# Patient Record
Sex: MALE | Race: WHITE | Employment: OTHER | URBAN - METROPOLITAN AREA
[De-identification: names, ages, dates, MRNs, and addresses within clinical notes are randomized per-mention and may not be internally consistent; named-entity substitution may affect disease eponyms.]

---

## 2018-02-15 ENCOUNTER — HOSPITAL ENCOUNTER (OUTPATIENT)
Dept: PREADMISSION TESTING | Age: 78
Discharge: HOME | End: 2018-02-15
Payer: MEDICARE

## 2018-02-15 VITALS
BODY MASS INDEX: 23.39 KG/M2 | HEIGHT: 67 IN | SYSTOLIC BLOOD PRESSURE: 136 MMHG | DIASTOLIC BLOOD PRESSURE: 76 MMHG | WEIGHT: 149 LBS | TEMPERATURE: 97.9 F | HEART RATE: 80 BPM

## 2018-02-15 LAB
ABO + RH BLD: NORMAL
ANION GAP SERPL CALC-SCNC: 9 MMOL/L (ref 5–15)
APPEARANCE UR: CLEAR
ATRIAL RATE: 70 BPM
BACTERIA URNS QL MICRO: NEGATIVE /HPF
BILIRUB UR QL: NEGATIVE
BLOOD GROUP ANTIBODIES SERPL: NORMAL
BUN SERPL-MCNC: 18 MG/DL (ref 6–20)
BUN/CREAT SERPL: 17 (ref 12–20)
CALCIUM SERPL-MCNC: 9.1 MG/DL (ref 8.5–10.1)
CALCULATED P AXIS, ECG09: 80 DEGREES
CALCULATED R AXIS, ECG10: 65 DEGREES
CALCULATED T AXIS, ECG11: 68 DEGREES
CHLORIDE SERPL-SCNC: 103 MMOL/L (ref 97–108)
CO2 SERPL-SCNC: 26 MMOL/L (ref 21–32)
COLOR UR: ABNORMAL
CREAT SERPL-MCNC: 1.07 MG/DL (ref 0.7–1.3)
DIAGNOSIS, 93000: NORMAL
EPITH CASTS URNS QL MICRO: ABNORMAL /LPF
ERYTHROCYTE [DISTWIDTH] IN BLOOD BY AUTOMATED COUNT: 14.3 % (ref 11.5–14.5)
EST. AVERAGE GLUCOSE BLD GHB EST-MCNC: 105 MG/DL
GLUCOSE SERPL-MCNC: 83 MG/DL (ref 65–100)
GLUCOSE UR STRIP.AUTO-MCNC: NEGATIVE MG/DL
HBA1C MFR BLD: 5.3 % (ref 4.2–6.3)
HCT VFR BLD AUTO: 38.7 % (ref 36.6–50.3)
HGB BLD-MCNC: 12.6 G/DL (ref 12.1–17)
HGB UR QL STRIP: NEGATIVE
HYALINE CASTS URNS QL MICRO: ABNORMAL /LPF (ref 0–5)
INR PPP: 1 (ref 0.9–1.1)
KETONES UR QL STRIP.AUTO: NEGATIVE MG/DL
LEUKOCYTE ESTERASE UR QL STRIP.AUTO: ABNORMAL
MCH RBC QN AUTO: 31.1 PG (ref 26–34)
MCHC RBC AUTO-ENTMCNC: 32.6 G/DL (ref 30–36.5)
MCV RBC AUTO: 95.6 FL (ref 80–99)
NITRITE UR QL STRIP.AUTO: NEGATIVE
NRBC # BLD: 0 K/UL (ref 0–0.01)
NRBC BLD-RTO: 0 PER 100 WBC
P-R INTERVAL, ECG05: 150 MS
PH UR STRIP: 5 [PH] (ref 5–8)
PLATELET # BLD AUTO: 269 K/UL (ref 150–400)
PMV BLD AUTO: 10.5 FL (ref 8.9–12.9)
POTASSIUM SERPL-SCNC: 3.9 MMOL/L (ref 3.5–5.1)
PROT UR STRIP-MCNC: NEGATIVE MG/DL
PROTHROMBIN TIME: 10.4 SEC (ref 9–11.1)
Q-T INTERVAL, ECG07: 424 MS
QRS DURATION, ECG06: 82 MS
QTC CALCULATION (BEZET), ECG08: 457 MS
RBC # BLD AUTO: 4.05 M/UL (ref 4.1–5.7)
RBC #/AREA URNS HPF: ABNORMAL /HPF (ref 0–5)
SODIUM SERPL-SCNC: 138 MMOL/L (ref 136–145)
SP GR UR REFRACTOMETRY: 1.02 (ref 1–1.03)
SPECIMEN EXP DATE BLD: NORMAL
UA: UC IF INDICATED,UAUC: ABNORMAL
UROBILINOGEN UR QL STRIP.AUTO: 0.2 EU/DL (ref 0.2–1)
VENTRICULAR RATE, ECG03: 70 BPM
WBC # BLD AUTO: 6.8 K/UL (ref 4.1–11.1)
WBC URNS QL MICRO: ABNORMAL /HPF (ref 0–4)

## 2018-02-15 PROCEDURE — 86900 BLOOD TYPING SEROLOGIC ABO: CPT | Performed by: ORTHOPAEDIC SURGERY

## 2018-02-15 PROCEDURE — 36415 COLL VENOUS BLD VENIPUNCTURE: CPT | Performed by: ORTHOPAEDIC SURGERY

## 2018-02-15 PROCEDURE — 80048 BASIC METABOLIC PNL TOTAL CA: CPT | Performed by: ORTHOPAEDIC SURGERY

## 2018-02-15 PROCEDURE — 87086 URINE CULTURE/COLONY COUNT: CPT | Performed by: ORTHOPAEDIC SURGERY

## 2018-02-15 PROCEDURE — 85027 COMPLETE CBC AUTOMATED: CPT | Performed by: ORTHOPAEDIC SURGERY

## 2018-02-15 PROCEDURE — 85610 PROTHROMBIN TIME: CPT | Performed by: ORTHOPAEDIC SURGERY

## 2018-02-15 PROCEDURE — 83036 HEMOGLOBIN GLYCOSYLATED A1C: CPT | Performed by: ORTHOPAEDIC SURGERY

## 2018-02-15 PROCEDURE — 81001 URINALYSIS AUTO W/SCOPE: CPT | Performed by: ORTHOPAEDIC SURGERY

## 2018-02-15 PROCEDURE — 93005 ELECTROCARDIOGRAM TRACING: CPT

## 2018-02-15 RX ORDER — TAMSULOSIN HYDROCHLORIDE 0.4 MG/1
0.8 CAPSULE ORAL
COMMUNITY

## 2018-02-15 RX ORDER — NYSTATIN 100000 [USP'U]/G
POWDER TOPICAL AS NEEDED
COMMUNITY
End: 2018-02-24

## 2018-02-15 RX ORDER — NAPROXEN SODIUM 220 MG
220 TABLET ORAL
COMMUNITY
End: 2018-02-24

## 2018-02-15 RX ORDER — HYDROCHLOROTHIAZIDE 12.5 MG/1
12.5 TABLET ORAL
COMMUNITY

## 2018-02-15 RX ORDER — DOXYCYCLINE 50 MG/1
50 TABLET ORAL
COMMUNITY

## 2018-02-15 NOTE — PERIOP NOTES
PREOPERATIVE INSTRUCTIONS REVIEWED WITH PATIENT. PATIENT GIVEN   CHG WIPES. INSTRUCTIONS REVIEWED  IN CLASS ON USE OF CHG WIPES. PATIENT GIVEN SSI INFECTION SHEET AND ALSO  MRSA/MSSA TREATMENT INSTRUCTION SHEET  WITH AN EXPLANATION TO PATIENT THAT THEY WILL BE NOTIFIED IF TREATMENT INSTRUCTIONS NEED TO BE INITIATED. PATIENT WAS GIVEN THE OPPORTUNITY TO ASK QUESTIONS ON THE INFORMATION PROVIDED.

## 2018-02-16 PROBLEM — M16.11 PRIMARY OSTEOARTHRITIS OF RIGHT HIP: Status: ACTIVE | Noted: 2018-02-16

## 2018-02-16 LAB
BACTERIA SPEC CULT: NORMAL
BACTERIA SPEC CULT: NORMAL
SERVICE CMNT-IMP: NORMAL

## 2018-02-16 NOTE — H&P
Chief Complaint: Pain of the Right Hip     Saad Olivarez comes in for follow-up of the right hip. He continues to have right hip pain. He also has quite significant stiffness when he walks after long periods of sitting. He has significant anterior right hip pain with ambulation and he walks with a limp favoring the right leg. He feels that his hip pain is affecting his daily activities and is unable to walk even short distances without severe pain.      Review of Systems   1/29/2018  Genitourinary: Frequent Urination: Positive  Immunological: Seasonal Allergies: Positive  Musculoskeletal: Joint Pain: Positive     Medical History        Past Medical History:   Diagnosis Date    Hyperlipidemia              Surgical History         Past Surgical History:   Procedure Laterality Date    NO RELEVANT SURGERIES                Objective:      There were no vitals filed for this visit.     Constitutional:  No acute distress. Well nourished. Well developed. Eyes:  Sclera are nonicteric. Respiratory:  No labored breathing. Cardiovascular:  No marked edema. Skin:  No marked skin ulcers. Neurological:  No marked sensory loss noted. Psychiatric: Alert and oriented x3. Musculoskeletal      On exam he has no pain to rotation of the left hip. He has essentially no rotation of the right hip and any attempts to rotate the hip are painful. His right leg is about 1cm shorter than contralateral leg. He has an 8 degree flexion contracture on the right. No effusion. No sign of infection. No ecchymosis or erythema. No cellulitis or rash. No calf pain, no evidence of DVT. The neurovascular status is intact. Imaging/Studies:    X-ray Hip Right 2+ Views     Result Date: 1/22/2018  AP Pelvis, Frog Lateral. Notes    I did order interpreted x-rays today of the patient's right hip to include two views which reveal severe degenerative arthritis of the right hip with complete joint space narrowing and early acetabular protrusio presentation.       Assessment:      1. Primary osteoarthritis of right hip             Plan:   I showed the patient X-rays of the right hip and explained that he has severe degenerative arthritis of the right hip with bone-on-bone contact. I explained the pathophysiology and we discussed treatment options. I explained that the only option that would give him long-term relief at this point is total hip replacement.     I explained the hospitalization, post-op and rehabilitation for the procedure. I discussed the use of spinal anesthesia. The patient is aware of all complications associated with the surgery, including the chance of infection and blood clots. He understands that he would be on antibiotics and anti-coagulants following surgery to prevent infection and DVT and that he would undergo a course of post-op physical therapy. I discussed issues of leg length. I also discussed total hip precautions to avoid dislocation following the surgery. He understands the post-op rehabilitation period and all potential complications. Following discussion he would like to proceed with the surgery. All of his questions were answered on today's visit. Patient has advanced degenerative arthritis of right hip. He has protrusio of the acetabulum. His right leg is also shorter. He wants to go ahead with right total hip replacement. We discussed the procedure, hospitalization, and all potential complications. We discussed medical complications as well as surgical.  We discussed issues of infection, DVT, pulmonary embolus, and use of anticoagulants. We discussed issues of instability and leg length. PROCEDURE: Right total hip replacement. Date of Surgery Update:  Cindy Alcazar was seen and examined. Past Medical History:   Diagnosis Date    Arthritis     Cancer ([facility] [address]     COLO-RECTAL    Chronic pain     Hypertension      Prior to Admission Medications   Prescriptions Last Dose Informant Patient Reported? Taking? CHOLECALCIFEROL, VITAMIN D3, (VITAMIN D3 PO) 2/14/2018 at Unknown time  Yes Yes   Sig: Take 1.25 mg by mouth every seven (7) days. WEDNESDAY   CYANOCOBALAMIN, VITAMIN B-12, (VITAMIN B-12 INJECTION) 2/16/2018  Yes No   Sig: by Injection route every month. MULTIVITAMIN PO 2/14/2018 at Unknown time  Yes Yes   Sig: Take  by mouth daily. doxycycline (ADOXA) 50 mg tablet 2/20/2018 at 1700  Yes Yes   Sig: Take 50 mg by mouth daily. hydroCHLOROthiazide (HYDRODIURIL) 12.5 mg tablet 2/20/2018 at 0800  Yes Yes   Sig: Take 12.5 mg by mouth daily. naproxen sodium (ALEVE) 220 mg tablet 2/16/2018  Yes No   Sig: Take 220 mg by mouth daily as needed. nystatin (MYCOSTATIN) powder 2/21/2018 at 0430  Yes Yes   Sig: Apply  to affected area as needed. tamsulosin (FLOMAX) 0.4 mg capsule 2/20/2018 at 1700  Yes Yes   Sig: Take 0.4 mg by mouth daily. Facility-Administered Medications: None      Allergy to:Review of patient's allergies indicates no known allergies. Physical Examination: General appearance - alert, well appearing, and in no distress  History and physical has been reviewed. The patient has been examined.  There have been no significant clinical changes since the completion of the originally dated History and Physical.    Signed By: Segundo Mendoza PA-C     February 21, 2018 7:32 AM

## 2018-02-17 LAB
BACTERIA SPEC CULT: NORMAL
CC UR VC: NORMAL
SERVICE CMNT-IMP: NORMAL

## 2018-02-20 ENCOUNTER — ANESTHESIA EVENT (OUTPATIENT)
Dept: SURGERY | Age: 78
DRG: 470 | End: 2018-02-20
Payer: MEDICARE

## 2018-02-21 ENCOUNTER — ANESTHESIA (OUTPATIENT)
Dept: SURGERY | Age: 78
DRG: 470 | End: 2018-02-21
Payer: MEDICARE

## 2018-02-21 ENCOUNTER — HOSPITAL ENCOUNTER (INPATIENT)
Age: 78
LOS: 2 days | Discharge: HOME HEALTH | DRG: 470 | End: 2018-02-23
Attending: ORTHOPAEDIC SURGERY | Admitting: ORTHOPAEDIC SURGERY
Payer: MEDICARE

## 2018-02-21 ENCOUNTER — APPOINTMENT (OUTPATIENT)
Dept: GENERAL RADIOLOGY | Age: 78
DRG: 470 | End: 2018-02-21
Attending: ORTHOPAEDIC SURGERY
Payer: MEDICARE

## 2018-02-21 DIAGNOSIS — M16.11 PRIMARY OSTEOARTHRITIS OF RIGHT HIP: Primary | ICD-10-CM

## 2018-02-21 LAB
GLUCOSE BLD STRIP.AUTO-MCNC: 95 MG/DL (ref 65–100)
SERVICE CMNT-IMP: NORMAL

## 2018-02-21 PROCEDURE — 77030008467 HC STPLR SKN COVD -B: Performed by: ORTHOPAEDIC SURGERY

## 2018-02-21 PROCEDURE — 97161 PT EVAL LOW COMPLEX 20 MIN: CPT

## 2018-02-21 PROCEDURE — C1776 JOINT DEVICE (IMPLANTABLE): HCPCS | Performed by: ORTHOPAEDIC SURGERY

## 2018-02-21 PROCEDURE — 74011250636 HC RX REV CODE- 250/636: Performed by: PHYSICIAN ASSISTANT

## 2018-02-21 PROCEDURE — 74011250637 HC RX REV CODE- 250/637: Performed by: PHYSICIAN ASSISTANT

## 2018-02-21 PROCEDURE — 77030018846 HC SOL IRR STRL H20 ICUM -A: Performed by: ORTHOPAEDIC SURGERY

## 2018-02-21 PROCEDURE — 73501 X-RAY EXAM HIP UNI 1 VIEW: CPT

## 2018-02-21 PROCEDURE — 74011250637 HC RX REV CODE- 250/637: Performed by: NURSE PRACTITIONER

## 2018-02-21 PROCEDURE — 77030031139 HC SUT VCRL2 J&J -A: Performed by: ORTHOPAEDIC SURGERY

## 2018-02-21 PROCEDURE — 74011250636 HC RX REV CODE- 250/636

## 2018-02-21 PROCEDURE — 77030018836 HC SOL IRR NACL ICUM -A: Performed by: ORTHOPAEDIC SURGERY

## 2018-02-21 PROCEDURE — 77030011640 HC PAD GRND REM COVD -A: Performed by: ORTHOPAEDIC SURGERY

## 2018-02-21 PROCEDURE — 76060000035 HC ANESTHESIA 2 TO 2.5 HR: Performed by: ORTHOPAEDIC SURGERY

## 2018-02-21 PROCEDURE — 74011000250 HC RX REV CODE- 250

## 2018-02-21 PROCEDURE — 77030006822 HC BLD SAW SAG BRSM -B: Performed by: ORTHOPAEDIC SURGERY

## 2018-02-21 PROCEDURE — 77030020782 HC GWN BAIR PAWS FLX 3M -B

## 2018-02-21 PROCEDURE — 77030012406 HC DRN WND PENRS BARD -A: Performed by: ORTHOPAEDIC SURGERY

## 2018-02-21 PROCEDURE — 77030034850: Performed by: ORTHOPAEDIC SURGERY

## 2018-02-21 PROCEDURE — 77030012893

## 2018-02-21 PROCEDURE — 65270000029 HC RM PRIVATE

## 2018-02-21 PROCEDURE — 74011000250 HC RX REV CODE- 250: Performed by: PHYSICIAN ASSISTANT

## 2018-02-21 PROCEDURE — 74011000258 HC RX REV CODE- 258: Performed by: PHYSICIAN ASSISTANT

## 2018-02-21 PROCEDURE — 76210000000 HC OR PH I REC 2 TO 2.5 HR: Performed by: ORTHOPAEDIC SURGERY

## 2018-02-21 PROCEDURE — 74011250636 HC RX REV CODE- 250/636: Performed by: ANESTHESIOLOGY

## 2018-02-21 PROCEDURE — P9045 ALBUMIN (HUMAN), 5%, 250 ML: HCPCS

## 2018-02-21 PROCEDURE — 82962 GLUCOSE BLOOD TEST: CPT

## 2018-02-21 PROCEDURE — 77030012935 HC DRSG AQUACEL BMS -B: Performed by: ORTHOPAEDIC SURGERY

## 2018-02-21 PROCEDURE — 77030007866 HC KT SPN ANES BBMI -B: Performed by: ANESTHESIOLOGY

## 2018-02-21 PROCEDURE — 77030020788: Performed by: ORTHOPAEDIC SURGERY

## 2018-02-21 PROCEDURE — 77030011264 HC ELECTRD BLD EXT COVD -A: Performed by: ORTHOPAEDIC SURGERY

## 2018-02-21 PROCEDURE — 77030020061 HC IV BLD WRMR ADMIN SET 3M -B: Performed by: ANESTHESIOLOGY

## 2018-02-21 PROCEDURE — 77030013079 HC BLNKT BAIR HGGR 3M -A: Performed by: ANESTHESIOLOGY

## 2018-02-21 PROCEDURE — 0SR904A REPLACEMENT OF RIGHT HIP JOINT WITH CERAMIC ON POLYETHYLENE SYNTHETIC SUBSTITUTE, UNCEMENTED, OPEN APPROACH: ICD-10-PCS | Performed by: ORTHOPAEDIC SURGERY

## 2018-02-21 PROCEDURE — 76010000171 HC OR TIME 2 TO 2.5 HR INTENSV-TIER 1: Performed by: ORTHOPAEDIC SURGERY

## 2018-02-21 PROCEDURE — 77030032490 HC SLV COMPR SCD KNE COVD -B: Performed by: ORTHOPAEDIC SURGERY

## 2018-02-21 PROCEDURE — 77030035236 HC SUT PDS STRATFX BARB J&J -B: Performed by: ORTHOPAEDIC SURGERY

## 2018-02-21 DEVICE — STEM FEM SZ 6 L150MM 130DEG STD OFFSET CALCAR HIP BILAT TI: Type: IMPLANTABLE DEVICE | Site: HIP | Status: FUNCTIONAL

## 2018-02-21 DEVICE — LINER ACET OD54MM ID36MM HIP ALTRX PINN: Type: IMPLANTABLE DEVICE | Site: HIP | Status: FUNCTIONAL

## 2018-02-21 DEVICE — HEAD FEM DIA36MM +5MM OFFSET 12/14 TAPR HIP CERAMIC BIOLOX: Type: IMPLANTABLE DEVICE | Site: HIP | Status: FUNCTIONAL

## 2018-02-21 DEVICE — CUP ACET DIA54MM HIP GRIPTION PRI CEMENTLESS FIX SECT SER: Type: IMPLANTABLE DEVICE | Site: HIP | Status: FUNCTIONAL

## 2018-02-21 DEVICE — SCR ACET CANC PINN 6.5X15MM SS --: Type: IMPLANTABLE DEVICE | Site: HIP | Status: FUNCTIONAL

## 2018-02-21 DEVICE — COMPONENT TOT HIP PRIMARY CERM ALTRX: Type: IMPLANTABLE DEVICE | Status: FUNCTIONAL

## 2018-02-21 RX ORDER — TAMSULOSIN HYDROCHLORIDE 0.4 MG/1
0.8 CAPSULE ORAL EVERY EVENING
Status: DISCONTINUED | OUTPATIENT
Start: 2018-02-22 | End: 2018-02-23 | Stop reason: HOSPADM

## 2018-02-21 RX ORDER — MORPHINE SULFATE 10 MG/ML
2 INJECTION, SOLUTION INTRAMUSCULAR; INTRAVENOUS
Status: DISCONTINUED | OUTPATIENT
Start: 2018-02-21 | End: 2018-02-21 | Stop reason: HOSPADM

## 2018-02-21 RX ORDER — FENTANYL CITRATE 50 UG/ML
50 INJECTION, SOLUTION INTRAMUSCULAR; INTRAVENOUS AS NEEDED
Status: DISCONTINUED | OUTPATIENT
Start: 2018-02-21 | End: 2018-02-21 | Stop reason: HOSPADM

## 2018-02-21 RX ORDER — SODIUM CHLORIDE 9 MG/ML
25 INJECTION, SOLUTION INTRAVENOUS CONTINUOUS
Status: DISCONTINUED | OUTPATIENT
Start: 2018-02-21 | End: 2018-02-21 | Stop reason: HOSPADM

## 2018-02-21 RX ORDER — OXYCODONE AND ACETAMINOPHEN 5; 325 MG/1; MG/1
1 TABLET ORAL AS NEEDED
Status: DISCONTINUED | OUTPATIENT
Start: 2018-02-21 | End: 2018-02-21 | Stop reason: HOSPADM

## 2018-02-21 RX ORDER — CEFAZOLIN SODIUM/WATER 2 G/20 ML
2 SYRINGE (ML) INTRAVENOUS EVERY 8 HOURS
Status: COMPLETED | OUTPATIENT
Start: 2018-02-21 | End: 2018-02-22

## 2018-02-21 RX ORDER — FAMOTIDINE 20 MG/1
20 TABLET, FILM COATED ORAL DAILY
Status: DISCONTINUED | OUTPATIENT
Start: 2018-02-21 | End: 2018-02-23 | Stop reason: HOSPADM

## 2018-02-21 RX ORDER — CEFAZOLIN SODIUM/WATER 2 G/20 ML
2 SYRINGE (ML) INTRAVENOUS EVERY 8 HOURS
Status: DISCONTINUED | OUTPATIENT
Start: 2018-02-21 | End: 2018-02-21 | Stop reason: HOSPADM

## 2018-02-21 RX ORDER — GLYCOPYRROLATE 0.2 MG/ML
INJECTION INTRAMUSCULAR; INTRAVENOUS AS NEEDED
Status: DISCONTINUED | OUTPATIENT
Start: 2018-02-21 | End: 2018-02-21 | Stop reason: HOSPADM

## 2018-02-21 RX ORDER — SODIUM CHLORIDE 0.9 % (FLUSH) 0.9 %
5-10 SYRINGE (ML) INJECTION AS NEEDED
Status: DISCONTINUED | OUTPATIENT
Start: 2018-02-21 | End: 2018-02-21 | Stop reason: HOSPADM

## 2018-02-21 RX ORDER — HYDROMORPHONE HYDROCHLORIDE 1 MG/ML
0.5 INJECTION, SOLUTION INTRAMUSCULAR; INTRAVENOUS; SUBCUTANEOUS
Status: DISCONTINUED | OUTPATIENT
Start: 2018-02-21 | End: 2018-02-21

## 2018-02-21 RX ORDER — ONDANSETRON 2 MG/ML
4 INJECTION INTRAMUSCULAR; INTRAVENOUS AS NEEDED
Status: DISCONTINUED | OUTPATIENT
Start: 2018-02-21 | End: 2018-02-21 | Stop reason: HOSPADM

## 2018-02-21 RX ORDER — ONDANSETRON 2 MG/ML
INJECTION INTRAMUSCULAR; INTRAVENOUS AS NEEDED
Status: DISCONTINUED | OUTPATIENT
Start: 2018-02-21 | End: 2018-02-21 | Stop reason: HOSPADM

## 2018-02-21 RX ORDER — SODIUM CHLORIDE 9 MG/ML
125 INJECTION, SOLUTION INTRAVENOUS CONTINUOUS
Status: DISPENSED | OUTPATIENT
Start: 2018-02-21 | End: 2018-02-22

## 2018-02-21 RX ORDER — ONDANSETRON 2 MG/ML
4 INJECTION INTRAMUSCULAR; INTRAVENOUS
Status: ACTIVE | OUTPATIENT
Start: 2018-02-21 | End: 2018-02-22

## 2018-02-21 RX ORDER — MIDAZOLAM HYDROCHLORIDE 1 MG/ML
INJECTION, SOLUTION INTRAMUSCULAR; INTRAVENOUS AS NEEDED
Status: DISCONTINUED | OUTPATIENT
Start: 2018-02-21 | End: 2018-02-21 | Stop reason: HOSPADM

## 2018-02-21 RX ORDER — FACIAL-BODY WIPES
10 EACH TOPICAL DAILY PRN
Status: DISCONTINUED | OUTPATIENT
Start: 2018-02-23 | End: 2018-02-23 | Stop reason: HOSPADM

## 2018-02-21 RX ORDER — MIDAZOLAM HYDROCHLORIDE 1 MG/ML
0.5 INJECTION, SOLUTION INTRAMUSCULAR; INTRAVENOUS
Status: DISCONTINUED | OUTPATIENT
Start: 2018-02-21 | End: 2018-02-21 | Stop reason: HOSPADM

## 2018-02-21 RX ORDER — ALBUMIN HUMAN 50 G/1000ML
SOLUTION INTRAVENOUS AS NEEDED
Status: DISCONTINUED | OUTPATIENT
Start: 2018-02-21 | End: 2018-02-21 | Stop reason: HOSPADM

## 2018-02-21 RX ORDER — SODIUM CHLORIDE 0.9 % (FLUSH) 0.9 %
5-10 SYRINGE (ML) INJECTION EVERY 8 HOURS
Status: DISCONTINUED | OUTPATIENT
Start: 2018-02-22 | End: 2018-02-23 | Stop reason: HOSPADM

## 2018-02-21 RX ORDER — HYDROCHLOROTHIAZIDE 25 MG/1
12.5 TABLET ORAL DAILY
Status: DISCONTINUED | OUTPATIENT
Start: 2018-02-22 | End: 2018-02-21

## 2018-02-21 RX ORDER — OXYCODONE HYDROCHLORIDE 5 MG/1
2.5 TABLET ORAL
Status: DISCONTINUED | OUTPATIENT
Start: 2018-02-21 | End: 2018-02-23 | Stop reason: HOSPADM

## 2018-02-21 RX ORDER — CELECOXIB 200 MG/1
200 CAPSULE ORAL 2 TIMES DAILY
Status: DISCONTINUED | OUTPATIENT
Start: 2018-02-21 | End: 2018-02-23 | Stop reason: HOSPADM

## 2018-02-21 RX ORDER — AMOXICILLIN 250 MG
1 CAPSULE ORAL 2 TIMES DAILY
Status: DISCONTINUED | OUTPATIENT
Start: 2018-02-21 | End: 2018-02-23 | Stop reason: HOSPADM

## 2018-02-21 RX ORDER — HYDROCHLOROTHIAZIDE 25 MG/1
12.5 TABLET ORAL DAILY
Status: DISCONTINUED | OUTPATIENT
Start: 2018-02-22 | End: 2018-02-23 | Stop reason: HOSPADM

## 2018-02-21 RX ORDER — HYDROMORPHONE HYDROCHLORIDE 1 MG/ML
0.2 INJECTION, SOLUTION INTRAMUSCULAR; INTRAVENOUS; SUBCUTANEOUS
Status: DISCONTINUED | OUTPATIENT
Start: 2018-02-21 | End: 2018-02-21 | Stop reason: HOSPADM

## 2018-02-21 RX ORDER — PROPOFOL 10 MG/ML
INJECTION, EMULSION INTRAVENOUS AS NEEDED
Status: DISCONTINUED | OUTPATIENT
Start: 2018-02-21 | End: 2018-02-21 | Stop reason: HOSPADM

## 2018-02-21 RX ORDER — HYDROMORPHONE HYDROCHLORIDE 1 MG/ML
.2-.4 INJECTION, SOLUTION INTRAMUSCULAR; INTRAVENOUS; SUBCUTANEOUS
Status: ACTIVE | OUTPATIENT
Start: 2018-02-21 | End: 2018-02-22

## 2018-02-21 RX ORDER — ACETAMINOPHEN 325 MG/1
650 TABLET ORAL EVERY 6 HOURS
Status: DISCONTINUED | OUTPATIENT
Start: 2018-02-21 | End: 2018-02-23 | Stop reason: HOSPADM

## 2018-02-21 RX ORDER — OXYCODONE HYDROCHLORIDE 5 MG/1
5 TABLET ORAL
Status: DISCONTINUED | OUTPATIENT
Start: 2018-02-21 | End: 2018-02-23 | Stop reason: HOSPADM

## 2018-02-21 RX ORDER — CELECOXIB 200 MG/1
200 CAPSULE ORAL ONCE
Status: COMPLETED | OUTPATIENT
Start: 2018-02-21 | End: 2018-02-21

## 2018-02-21 RX ORDER — NYSTATIN 100000 [USP'U]/G
POWDER TOPICAL AS NEEDED
Status: DISCONTINUED | OUTPATIENT
Start: 2018-02-21 | End: 2018-02-23 | Stop reason: HOSPADM

## 2018-02-21 RX ORDER — SODIUM CHLORIDE 0.9 % (FLUSH) 0.9 %
5-10 SYRINGE (ML) INJECTION AS NEEDED
Status: DISCONTINUED | OUTPATIENT
Start: 2018-02-21 | End: 2018-02-23 | Stop reason: HOSPADM

## 2018-02-21 RX ORDER — EPHEDRINE SULFATE 50 MG/ML
INJECTION, SOLUTION INTRAVENOUS AS NEEDED
Status: DISCONTINUED | OUTPATIENT
Start: 2018-02-21 | End: 2018-02-21 | Stop reason: HOSPADM

## 2018-02-21 RX ORDER — DOXYCYCLINE HYCLATE 100 MG
50 TABLET ORAL EVERY EVENING
Status: DISCONTINUED | OUTPATIENT
Start: 2018-02-21 | End: 2018-02-21 | Stop reason: SDUPTHER

## 2018-02-21 RX ORDER — EPHEDRINE SULFATE 50 MG/ML
10 INJECTION, SOLUTION INTRAVENOUS ONCE
Status: COMPLETED | OUTPATIENT
Start: 2018-02-21 | End: 2018-02-21

## 2018-02-21 RX ORDER — DIPHENHYDRAMINE HYDROCHLORIDE 50 MG/ML
INJECTION, SOLUTION INTRAMUSCULAR; INTRAVENOUS AS NEEDED
Status: DISCONTINUED | OUTPATIENT
Start: 2018-02-21 | End: 2018-02-21 | Stop reason: HOSPADM

## 2018-02-21 RX ORDER — BUPIVACAINE HYDROCHLORIDE 7.5 MG/ML
INJECTION, SOLUTION EPIDURAL; RETROBULBAR AS NEEDED
Status: DISCONTINUED | OUTPATIENT
Start: 2018-02-21 | End: 2018-02-21 | Stop reason: HOSPADM

## 2018-02-21 RX ORDER — DIPHENHYDRAMINE HYDROCHLORIDE 50 MG/ML
12.5 INJECTION, SOLUTION INTRAMUSCULAR; INTRAVENOUS AS NEEDED
Status: DISCONTINUED | OUTPATIENT
Start: 2018-02-21 | End: 2018-02-21 | Stop reason: HOSPADM

## 2018-02-21 RX ORDER — DOXYCYCLINE HYCLATE 100 MG
50 TABLET ORAL EVERY EVENING
Status: DISCONTINUED | OUTPATIENT
Start: 2018-02-21 | End: 2018-02-23 | Stop reason: HOSPADM

## 2018-02-21 RX ORDER — WARFARIN 10 MG/1
10 TABLET ORAL ONCE
Status: COMPLETED | OUTPATIENT
Start: 2018-02-21 | End: 2018-02-21

## 2018-02-21 RX ORDER — SODIUM CHLORIDE 0.9 % (FLUSH) 0.9 %
5-10 SYRINGE (ML) INJECTION EVERY 8 HOURS
Status: DISCONTINUED | OUTPATIENT
Start: 2018-02-21 | End: 2018-02-21 | Stop reason: HOSPADM

## 2018-02-21 RX ORDER — LIDOCAINE HYDROCHLORIDE 10 MG/ML
0.1 INJECTION, SOLUTION EPIDURAL; INFILTRATION; INTRACAUDAL; PERINEURAL AS NEEDED
Status: DISCONTINUED | OUTPATIENT
Start: 2018-02-21 | End: 2018-02-21 | Stop reason: HOSPADM

## 2018-02-21 RX ORDER — SODIUM CHLORIDE, SODIUM LACTATE, POTASSIUM CHLORIDE, CALCIUM CHLORIDE 600; 310; 30; 20 MG/100ML; MG/100ML; MG/100ML; MG/100ML
125 INJECTION, SOLUTION INTRAVENOUS CONTINUOUS
Status: DISCONTINUED | OUTPATIENT
Start: 2018-02-21 | End: 2018-02-21 | Stop reason: HOSPADM

## 2018-02-21 RX ORDER — NALOXONE HYDROCHLORIDE 0.4 MG/ML
0.4 INJECTION, SOLUTION INTRAMUSCULAR; INTRAVENOUS; SUBCUTANEOUS AS NEEDED
Status: DISCONTINUED | OUTPATIENT
Start: 2018-02-21 | End: 2018-02-23 | Stop reason: HOSPADM

## 2018-02-21 RX ORDER — OXYCODONE HYDROCHLORIDE 5 MG/1
10 TABLET ORAL
Status: DISCONTINUED | OUTPATIENT
Start: 2018-02-21 | End: 2018-02-21

## 2018-02-21 RX ORDER — OXYCODONE HYDROCHLORIDE 5 MG/1
5 TABLET ORAL
Status: DISCONTINUED | OUTPATIENT
Start: 2018-02-21 | End: 2018-02-21

## 2018-02-21 RX ORDER — HYDROXYZINE HYDROCHLORIDE 10 MG/1
10 TABLET, FILM COATED ORAL
Status: DISCONTINUED | OUTPATIENT
Start: 2018-02-21 | End: 2018-02-23 | Stop reason: HOSPADM

## 2018-02-21 RX ORDER — EPHEDRINE SULFATE 50 MG/ML
INJECTION, SOLUTION INTRAVENOUS
Status: COMPLETED
Start: 2018-02-21 | End: 2018-02-21

## 2018-02-21 RX ORDER — PROPOFOL 10 MG/ML
INJECTION, EMULSION INTRAVENOUS
Status: DISCONTINUED | OUTPATIENT
Start: 2018-02-21 | End: 2018-02-21 | Stop reason: HOSPADM

## 2018-02-21 RX ORDER — FAMOTIDINE 20 MG/1
20 TABLET, FILM COATED ORAL
Status: DISCONTINUED | OUTPATIENT
Start: 2018-02-21 | End: 2018-02-21

## 2018-02-21 RX ORDER — POLYETHYLENE GLYCOL 3350 17 G/17G
17 POWDER, FOR SOLUTION ORAL DAILY
Status: DISCONTINUED | OUTPATIENT
Start: 2018-02-22 | End: 2018-02-23 | Stop reason: HOSPADM

## 2018-02-21 RX ORDER — FENTANYL CITRATE 50 UG/ML
25 INJECTION, SOLUTION INTRAMUSCULAR; INTRAVENOUS
Status: DISCONTINUED | OUTPATIENT
Start: 2018-02-21 | End: 2018-02-21 | Stop reason: HOSPADM

## 2018-02-21 RX ORDER — ACETAMINOPHEN 325 MG/1
650 TABLET ORAL
Status: DISCONTINUED | OUTPATIENT
Start: 2018-02-21 | End: 2018-02-23 | Stop reason: HOSPADM

## 2018-02-21 RX ORDER — MIDAZOLAM HYDROCHLORIDE 1 MG/ML
1 INJECTION, SOLUTION INTRAMUSCULAR; INTRAVENOUS AS NEEDED
Status: DISCONTINUED | OUTPATIENT
Start: 2018-02-21 | End: 2018-02-21 | Stop reason: HOSPADM

## 2018-02-21 RX ADMIN — EPHEDRINE SULFATE 5 MG: 50 INJECTION, SOLUTION INTRAVENOUS at 08:25

## 2018-02-21 RX ADMIN — PROPOFOL 25 MG: 10 INJECTION, EMULSION INTRAVENOUS at 07:40

## 2018-02-21 RX ADMIN — MIDAZOLAM HYDROCHLORIDE 1 MG: 1 INJECTION, SOLUTION INTRAMUSCULAR; INTRAVENOUS at 07:40

## 2018-02-21 RX ADMIN — ONDANSETRON 4 MG: 2 INJECTION INTRAMUSCULAR; INTRAVENOUS at 09:41

## 2018-02-21 RX ADMIN — WARFARIN SODIUM 10 MG: 10 TABLET ORAL at 14:40

## 2018-02-21 RX ADMIN — PROPOFOL 25 MG: 10 INJECTION, EMULSION INTRAVENOUS at 07:41

## 2018-02-21 RX ADMIN — ALBUMIN HUMAN 250 ML: 50 SOLUTION INTRAVENOUS at 08:57

## 2018-02-21 RX ADMIN — GLYCOPYRROLATE 0.2 MG: 0.2 INJECTION INTRAMUSCULAR; INTRAVENOUS at 08:13

## 2018-02-21 RX ADMIN — BUPIVACAINE HYDROCHLORIDE 12.5 MG: 7.5 INJECTION, SOLUTION EPIDURAL; RETROBULBAR at 07:45

## 2018-02-21 RX ADMIN — PROPOFOL 25 MCG/KG/MIN: 10 INJECTION, EMULSION INTRAVENOUS at 07:48

## 2018-02-21 RX ADMIN — DOXYCYCLINE HYCLATE 50 MG: 100 TABLET, COATED ORAL at 20:45

## 2018-02-21 RX ADMIN — Medication 2 G: at 23:57

## 2018-02-21 RX ADMIN — MIDAZOLAM HYDROCHLORIDE 2 MG: 1 INJECTION, SOLUTION INTRAMUSCULAR; INTRAVENOUS at 07:37

## 2018-02-21 RX ADMIN — ALBUMIN HUMAN 250 ML: 50 SOLUTION INTRAVENOUS at 08:20

## 2018-02-21 RX ADMIN — EPHEDRINE SULFATE 5 MG: 50 INJECTION, SOLUTION INTRAVENOUS at 08:49

## 2018-02-21 RX ADMIN — MIDAZOLAM HYDROCHLORIDE 2 MG: 1 INJECTION, SOLUTION INTRAMUSCULAR; INTRAVENOUS at 07:33

## 2018-02-21 RX ADMIN — ACETAMINOPHEN 650 MG: 325 TABLET, FILM COATED ORAL at 20:44

## 2018-02-21 RX ADMIN — PROPOFOL 25 MG: 10 INJECTION, EMULSION INTRAVENOUS at 07:47

## 2018-02-21 RX ADMIN — EPHEDRINE SULFATE 5 MG: 50 INJECTION, SOLUTION INTRAVENOUS at 08:39

## 2018-02-21 RX ADMIN — ACETAMINOPHEN 650 MG: 325 TABLET, FILM COATED ORAL at 14:40

## 2018-02-21 RX ADMIN — EPHEDRINE SULFATE 10 MG: 50 INJECTION, SOLUTION INTRAVENOUS at 11:26

## 2018-02-21 RX ADMIN — FAMOTIDINE 20 MG: 20 TABLET, FILM COATED ORAL at 18:03

## 2018-02-21 RX ADMIN — EPHEDRINE SULFATE 5 MG: 50 INJECTION, SOLUTION INTRAVENOUS at 08:22

## 2018-02-21 RX ADMIN — PROPOFOL 25 MG: 10 INJECTION, EMULSION INTRAVENOUS at 07:43

## 2018-02-21 RX ADMIN — SODIUM CHLORIDE 125 ML/HR: 900 INJECTION, SOLUTION INTRAVENOUS at 10:57

## 2018-02-21 RX ADMIN — SODIUM CHLORIDE, SODIUM LACTATE, POTASSIUM CHLORIDE, AND CALCIUM CHLORIDE: 600; 310; 30; 20 INJECTION, SOLUTION INTRAVENOUS at 08:32

## 2018-02-21 RX ADMIN — Medication 2 G: at 07:53

## 2018-02-21 RX ADMIN — CELECOXIB 200 MG: 200 CAPSULE ORAL at 21:44

## 2018-02-21 RX ADMIN — EPHEDRINE SULFATE 10 MG: 50 INJECTION INTRAVENOUS at 11:26

## 2018-02-21 RX ADMIN — CELECOXIB 200 MG: 200 CAPSULE ORAL at 06:57

## 2018-02-21 RX ADMIN — PROPOFOL 25 MG: 10 INJECTION, EMULSION INTRAVENOUS at 07:39

## 2018-02-21 RX ADMIN — TRANEXAMIC ACID 1 G: 100 INJECTION, SOLUTION INTRAVENOUS at 07:57

## 2018-02-21 RX ADMIN — EPHEDRINE SULFATE 5 MG: 50 INJECTION, SOLUTION INTRAVENOUS at 08:23

## 2018-02-21 RX ADMIN — SODIUM CHLORIDE, SODIUM LACTATE, POTASSIUM CHLORIDE, AND CALCIUM CHLORIDE: 600; 310; 30; 20 INJECTION, SOLUTION INTRAVENOUS at 09:54

## 2018-02-21 RX ADMIN — SODIUM CHLORIDE, SODIUM LACTATE, POTASSIUM CHLORIDE, AND CALCIUM CHLORIDE 125 ML/HR: 600; 310; 30; 20 INJECTION, SOLUTION INTRAVENOUS at 06:58

## 2018-02-21 RX ADMIN — DIPHENHYDRAMINE HYDROCHLORIDE 12.5 MG: 50 INJECTION, SOLUTION INTRAMUSCULAR; INTRAVENOUS at 07:49

## 2018-02-21 RX ADMIN — Medication 2 G: at 18:03

## 2018-02-21 RX ADMIN — EPHEDRINE SULFATE 5 MG: 50 INJECTION, SOLUTION INTRAVENOUS at 08:19

## 2018-02-21 NOTE — ANESTHESIA POSTPROCEDURE EVALUATION
Post-Anesthesia Evaluation and Assessment    Patient: Kashif Pacheco MRN: [id number]  SSN: [id number]    YOB: 1940  Age: 68 y.o. Sex: male       Cardiovascular Function/Vital Signs  Visit Vitals    /57 (BP 1 Location: Left arm, BP Patient Position: Prone)    Pulse 77    Temp 35.3 °C (95.5 °F)    Resp 16    Ht 5' 7\" (1.702 m)    Wt 67.6 kg (149 lb)    SpO2 100%    BMI 23.34 kg/m2       Patient is status post spinal anesthesia for Procedure(s):  RIGHT TOTAL HIP ARTHROPLASTY  . Nausea/Vomiting: None    Postoperative hydration reviewed and adequate. Pain:  Pain Scale 1: Numeric (0 - 10) (02/21/18 1230)  Pain Intensity 1: 0 (02/21/18 1230)   Managed    Neurological Status:   Neuro (WDL): Within Defined Limits (02/21/18 1128)  Neuro  Neurologic State: (P) Alert (02/21/18 1230)  Orientation Level: (P) Oriented X4 (02/21/18 1230)  Cognition: (P) Appropriate for age attention/concentration (02/21/18 1230)  Speech: (P) Clear (02/21/18 1230)  LUE Motor Response: (P) Purposeful (02/21/18 1230)  LLE Motor Response: (P) Purposeful (02/21/18 1230)  RUE Motor Response: (P) Purposeful (02/21/18 1230)  RLE Motor Response: (P) Purposeful (02/21/18 1230)   At baseline    Mental Status and Level of Consciousness: Arousable    Pulmonary Status:   O2 Device: Room air (02/21/18 1230)   Adequate oxygenation and airway patent    Complications related to anesthesia: None    Post-anesthesia assessment completed.  No concerns    Signed By: Fiorella Henley MD     February 21, 2018

## 2018-02-21 NOTE — ANESTHESIA PREPROCEDURE EVALUATION
Anesthetic History No history of anesthetic complications Review of Systems / Medical History Patient summary reviewed, nursing notes reviewed and pertinent labs reviewed Pulmonary Within defined limits Neuro/Psych Within defined limits Cardiovascular Within defined limits Exercise tolerance: >4 METS 
  
GI/Hepatic/Renal 
Within defined limits Endo/Other Arthritis Other Findings Comments: H/o colorectal ca Physical Exam 
 
Airway Mallampati: I 
TM Distance: > 6 cm Neck ROM: normal range of motion Mouth opening: Normal 
 
 Cardiovascular Regular rate and rhythm,  S1 and S2 normal,  no murmur, click, rub, or gallop Dental 
No notable dental hx Pulmonary Breath sounds clear to auscultation Abdominal 
GI exam deferred Other Findings Anesthetic Plan ASA: 2 Anesthesia type: spinal 
 
 
 
 
Induction: Intravenous Anesthetic plan and risks discussed with: Patient All questions answered.

## 2018-02-21 NOTE — BRIEF OP NOTE
BRIEF OPERATIVE NOTE    Date of Procedure: 2/21/2018   Preoperative Diagnosis: DJD RIGHT HIP   Postoperative Diagnosis: DJD RIGHT HIP     Procedure(s):  RIGHT TOTAL HIP ARTHROPLASTY    Surgeon(s) and Role:     * Catracho Adams MD - Primary         Assistant Staff: Physician Assistant: Celio Sullivan PA-C      Surgical Staff:  Circ-1: Thomes Hashimoto, RN  Circ-Relief: Valentina Carter RN  Physician Assistant: Celio Sullivan PA-C  Radiology Technician: RT Anay, R  Scrub RN-1: Brayden Smith  Surg Asst-1: Chevis Hasmukh  Event Time In   Incision Start 2498   Incision Close 4463     Anesthesia: Spinal   Estimated Blood Loss: 200 mL  Specimens: * No specimens in log *   Findings: DJD Right hip   Complications: None. Implants:   Implant Name Type Inv.  Item Serial No.  Lot No. LRB No. Used Action   CUP ACET SECTOR GRIPTION 54MM -- TI - SNA  CUP ACET SECTOR GRIPTION 54MM -- TI NA [facility] [employer] [id number] Right 1 Implanted   SCR ACET CANC PINN 6.5X15MM SS --  - SNA  SCR ACET CANC PINN 6.5X15MM SS --  NA [facility] [id number] Right 1 Implanted   STEM FEM 12/14 TAPR 6 -- [place] - SNA  STEM FEM 12/14 TAPR 6 -- [place] NA [facility] [id number] Right 1 Implanted   LINER ACET PINN NEUT [id number] -- ALTRX - SNA  LINER ACET PINN NEUT [id number] -- ALTRX NA [facility] [id number] Right 1 Implanted   HEAD FEM S-ROM 36MM +5MM NK -- [employer] [place] - SNA   HEAD FEM S-ROM 36MM +5MM NK -- BIOLOX DELTA NA [facility] [id number] Right 1 Implanted

## 2018-02-21 NOTE — PERIOP NOTES
TRANSFER - OUT REPORT:    Verbal report given to Xander Alvarez on Mak Boom  being transferred to [address] [place] [address] for routine post - op       Report consisted of patients Situation, Background, Assessment and   Recommendations(SBAR). Time Pre op antibiotic ZPTAV:5655  Anesthesia Stop time: 6888  Lam Present on Transfer to floor:yes  Order for Lam on Chart:yes  Discharge Prescriptions with Chart:no    Information from the following report(s) SBAR, OR Summary, Intake/Output and MAR was reviewed with the receiving nurse. Opportunity for questions and clarification was provided. Is the patient on 02? NO       L/Min 0       Other 0    Is the patient on a monitor? NO    Is the nurse transporting with the patient? NO    Surgical Waiting Area notified of patient's transfer from PACU? YES      The following personal items collected during your admission accompanied patient upon transfer:   Dental Appliance: Dental Appliances: None  Vision: Visual Aid: Glasses (to pacu in plastic bag)  Hearing Aid: Hearing Aid: None  Jewelry: Jewelry: None  Clothing: Clothing:  (clothes, glasses, cane returned to pt in pacu)  Other Valuables:  Other Valuables: Eyeglasses (to pacu in plastic bag)  Valuables sent to safe:

## 2018-02-21 NOTE — PROGRESS NOTES
Bedside shift change report given to Savannah Toledo (oncoming nurse) by Vin Washburn (offgoing nurse). Report included the following information SBAR, Kardex, Intake/Output and MAR.

## 2018-02-21 NOTE — PROGRESS NOTES
TRANSFER - IN REPORT:    Verbal report received from [facility]) on [facility]  being received from PACU (unit) for routine post - op      Report consisted of patients Situation, Background, Assessment and   Recommendations(SBAR). Information from the following report(s) SBAR, Kardex, Intake/Output and MAR was reviewed with the receiving nurse. Opportunity for questions and clarification was provided. Assessment completed upon patients arrival to unit and care assumed.

## 2018-02-21 NOTE — OP NOTES
[address]  ACUTE CARE OP NOTE    Name:Isabelle BRITT  MR#: [id number]  : 1940  ACCOUNT #: [de-identified]   DATE OF SERVICE: 2018    PREOPERATIVE DIAGNOSES:    1. Advanced degenerative arthritis, right hip. 2.  Acetabular protrusio right hip. POSTOPERATIVE DIAGNOSES:  same    PROCEDURE:  Right total hip replacement. ANESTHESIA:  Spinal plus general.    ESTIMATED BLOOD LOSS:  200 mL. DRAINS:  None. COMPLICATIONS:  None. SPECIMEN:  Right femoral head. SURGEON:  Prashant Menjivar MD      ASSISTANT:  Ignacio Peñaloza PA-C    IMPLANTS:  Total hip components as listed in the operative report. COMPLICATIONS:  None. INDICATIONS:  The patient has advanced degenerative arthritis of his right hip. He also has acetabular protrusio. He understands the procedure and all potential complications, now presents for the above procedure. PROCEDURE:  On day of operation, the patient was taken to the operating room, spinal anesthesia administered. The patient was placed in the left lateral decubitus position and positioned with [facility] positioner. Supplemental general anesthesia was also appropriate. The right hip was prepped and draped in the usual fashion. A mini posterolateral incision was made of the hip, it was carried through subcutaneous tissue. Tensor fascia jefferson was sharply divided. Fascia of the gluteal muscles was divided in line with their fibers. Charnley retractor was carefully placed. External rotator muscles were taken down. A T-shaped posterior capsular incision made. There was marked reduction in motion of the hip because of degenerative changes and protrusio. Some osteophytes from the posterior acetabulum were removed with an osteotome and the hip was then dislocated. Oscillating saw was used to make the femoral neck osteotomy. Retractors were carefully placed around the acetabulum. Acetabulum was cleared of osteophytes.   Acetabulum was then reamed to 53 mm and felt to have good coverage and good bone quality at this point. A 54 mm Tiline porous coated Gription technology acetabular component was press-fit into place and felt to have a tight fit with proper position, one superior screw was placed, the 36 mm trial liner placed. Attention was then turned to the femur. Box osteotome was utilized. Femur was reamed to a #5 and the [place] system. It was broached to a #5 and felt to have a tight fit. X-ray revealed proper position of the components. Various head and neck trials were utilized. The standard +5 provided the best fit, range of motion, with proper stability in all planes of motion. Some anterior tissue was removed to prevent impingement. The trial components were then removed. The 36 mm polyethylene liner placed in the acetabulum. The standard #5 [place] porous coated femoral stem was press fit into place on the femur and felt to have a tight fit. The +5 x 36 ceramic head introduced and reduced. It was felt to be stable with proper leg lengths and stability. The incisions were thoroughly irrigated. Coagulation achieved with electrocautery. Posterior capsule repaired with #1 Vicryl suture. The fascia closed with #1 Vicryl, 2-0 Vicryl was used to close subcutaneous tissue and staples used to close the skin. Sterile dressings were applied. Patient taken to recovery room in satisfactory condition. The assistant is Dilma Amador PA-C, who assisted me with positioning, retraction, implant installation, and incision and closure. MD MISSY Pires / GILDARDO  D: 02/21/2018 15:03     T: 02/21/2018 15:30  JOB #: [id number]  CC: Meeta Stevenson MD

## 2018-02-21 NOTE — PROGRESS NOTES
NP ORTHO PROGRESS NOTE  Admit date: 2/21/2018  Date of Surgery: 2/21/2018   Procedures: Procedure(s):  RIGHT TOTAL HIP ARTHROPLASTY    Admitting Physician: Eddie Pavon MD   Surgeon: Tarik Kessler) and Role:     Russel Moody MD - Primary    Chart/Meds/Labs Reviewed  Current Facility-Administered Medications   Medication Dose Route Frequency    0.9% sodium chloride infusion  125 mL/hr IntraVENous CONTINUOUS    [START ON 2/22/2018] sodium chloride (NS) flush 5-10 mL  5-10 mL IntraVENous Q8H    sodium chloride (NS) flush 5-10 mL  5-10 mL IntraVENous PRN    acetaminophen (TYLENOL) tablet 650 mg  650 mg Oral Q6H    acetaminophen (TYLENOL) tablet 650 mg  650 mg Oral Q6H PRN    celecoxib (CELEBREX) capsule 200 mg  200 mg Oral BID    naloxone (NARCAN) injection 0.4 mg  0.4 mg IntraVENous PRN    ceFAZolin (ANCEF) 2 g/20 mL in sterile water IV syringe  2 g IntraVENous Q8H    ondansetron (ZOFRAN) injection 4 mg  4 mg IntraVENous Q4H PRN    hydrOXYzine HCl (ATARAX) tablet 10 mg  10 mg Oral Q8H PRN    senna-docusate (PERICOLACE) 8.6-50 mg per tablet 1 Tab  1 Tab Oral BID    [START ON 2/22/2018] polyethylene glycol (MIRALAX) packet 17 g  17 g Oral DAILY    [START ON 2/23/2018] bisacodyl (DULCOLAX) suppository 10 mg  10 mg Rectal DAILY PRN    warfarin (COUMADIN) tablet 10 mg  10 mg Oral ONCE    [START ON 2/22/2018] . PHARMACY TO SUBSTITUTE PER PROTOCOL    Per Protocol    nystatin (MYCOSTATIN) 100,000 unit/gram powder   Topical PRN    [START ON 2/22/2018] tamsulosin (FLOMAX) capsule 0.4 mg  0.4 mg Oral DAILY    sodium chloride 0.9 % bolus infusion 500 mL  500 mL IntraVENous ONCE PRN    Warfarin MD to Dose   Other PRN    famotidine (PEPCID) tablet 20 mg  20 mg Oral DAILY    oxyCODONE IR (ROXICODONE) tablet 5 mg  5 mg Oral Q3H PRN    oxyCODONE IR (ROXICODONE) tablet 2.5 mg  2.5 mg Oral Q3H PRN    [START ON 2/22/2018] hydroCHLOROthiazide (HYDRODIURIL) tablet 12.5 mg  12.5 mg Oral DAILY    HYDROmorphone (PF) (DILAUDID) injection 0.2-0.4 mg  0.2-0.4 mg IntraVENous Q4H PRN       Subjective:    Complaints: None now, but was dizzy &  RN's report of low BPs in  PACU & hasn't been OOB yet. Denies Dizziness, CP, SOB, N/V, Abdominal pain, numbness or tingling of extremities. Able to perform ankle pumps easily. Incisional pain control: well controlled. Not taking opioids PTA  Pain Control:   Pain Assessment  Pain Scale 1: Numeric (0 - 10)  Pain Intensity 1: 0  Pain Onset 1: chronic  Pain Location 1: Hip  Pain Orientation 1: Right  Pain Description 1: Sore  Pain Intervention(s) 1: Rest, Repositioned    Oral diet: Tolerating diet well    Objective:  General: Alert,Ox4, cooperative, NAD  HEENT: Atraumatic, PERRL, anicteric sclerae  Lungs: Bilateral expansion. Equal excursion. No accessory muscle use. Gastrointestinal:  Soft, non-tender, non-distended  Extremities:  Neurovasc exam WDL. + DP pulses. Sensation intact to light touch. Motor: + DF/PF          Calves non-tender upon palpation or with passive stretch. No significant erythema or swelling. Dressing: clean, dry and intact.     Vital Signs:   Visit Vitals    /57 (BP 1 Location: Left arm, BP Patient Position: Prone)    Pulse 77    Temp 95.5 °F (35.3 °C)    Resp 16    Ht 5' 7\" (1.702 m)    Wt 67.6 kg (149 lb)    SpO2 100%    BMI 23.34 kg/m2    O2 Flow Rate (L/min): 2 l/min O2 Device: Room air   Patient Vitals for the past 24 hrs:   BP Temp Pulse Resp SpO2 Height Weight   02/21/18 1230 106/57 95.5 °F (35.3 °C) 77 16 100 % - -   02/21/18 1205 - - 80 15 98 % - -   02/21/18 1203 105/51 97.6 °F (36.4 °C) - - - - -   02/21/18 1200 105/51 - 75 10 97 % - -   02/21/18 1145 96/52 - 82 16 99 % - -   02/21/18 1130 97/51 - 68 11 97 % - -   02/21/18 1115 91/47 - 72 13 100 % - -   02/21/18 1100 95/53 - 66 9 100 % - -   02/21/18 1045 97/52 - 70 8 100 % - -   02/21/18 1040 - - 67 8 100 % - -   02/21/18 1035 - - 73 11 100 % - -   02/21/18 1030 (!) 50 - 76 11 100 % - -   18 1025 - - 76 11 100 % - -   18 1020 - - 83 15 100 % - -   18 1015 (!) 8952 - 83 8 99 % - -   18 1010 (!) 83/47 - 87 9 99 % - -   18 1005 90/45 - 88 9 98 % - -   18 1003 (!)  -  10 98 % - -   18 1000 (!)  - 92 (!) 5 99 % - -   18 0634 127/86 97.7 °F (36.5 °C) 96 8 100 % 5' 7\" (1.702 m) 67.6 kg (149 lb)     Temp (24hrs), Av.9 °F (36.1 °C), Min:95.5 °F (35.3 °C), Max:97.7 °F (36.5 °C)      Intake/output-last 8 hours:    07 -  1900  In: 5475 [P.O.:200; I.V.:3000]  Out: 660 [Urine:560]    Intake/output- 24 hours:       LAB:   Recent Results (from the past 24 hour(s))   GLUCOSE, POC    Collection Time: 18  7:02 AM   Result Value Ref Range    Glucose (POC) 95 65 - 100 mg/dL    Performed by Daniel Michel       Lab Results   Component Value Date/Time    INR 1.0 02/15/2018 12:03 PM     Lab Results   Component Value Date/Time    HGB 12.6 02/15/2018 12:03 PM     No results for input(s): NA, K, CL, BUN, CREA, GLU, CA, MG, PHOS, ALB, TBIL, TBILI, SGOT in the last 72 hours. No lab exists for component: ALT;3    PT/OT:   Gait:                    PATIENT MOBILITY                         Assessment and Plan    Principal Problem:    Primary osteoarthritis of right hip (2018)          POD#0 Procedure(s):  RIGHT TOTAL HIP ARTHROPLASTY    Stable (BP on low side) thus far postop. Wound benign. Neurovascularly intact. No indications of VTE, bleeding, or other postop complication. However must monitor carefully for this very pleasant elderly gentleman with only significant hx of HTN & opioid naive! Watch hemodynamics. VTE prophylaxis: Warfarin (dosing per Dr. Ohara Knife above , Mobilization, Ankle pumping exercises, SCDs   Weight bearin% on operative leg. No extreme motions. Pain management: Minimize opioids as much as possible.  Multi-modal pain plan, see above for medication,  Ice packs & elevation of extremity per orders, active gentle ROM & mobilize frequently for short periods of time.    PT later this afternoon & OT evaluate tomorrow      Continue plans per Dr. Jasmina García    Signed By: Viktoriya Oliver, NP    RN, MSN, MA, Adult NP-BC

## 2018-02-21 NOTE — IP AVS SNAPSHOT
[facility] [address] [place] [address] 
[phone] Patient: Roseline Casper MRN: [id number] DLU:[id number] About your hospitalization You were admitted on:  February 21, 2018 [facility] were discharged on:  February 23, 2018 Why you were hospitalized Your primary diagnosis was:  Primary Osteoarthritis Of Right Hip Follow-up Information Follow up With Details Comments Contact Info Renae Tran MD   [address] [facility] [address] [facility] [address] [phone] [address] [facility]   call agency if not contacted by noon the day after discharge to inquire as to the day and time of intial visit. [address] 
1st Floor [facility] [address] 
[phone] Discharge Orders None A check sowmya indicates which time of day the medication should be taken. My Medications START taking these medications Instructions Each Dose to Equal  
 Morning Noon Evening Bedtime  
 celecoxib 200 mg capsule Commonly known as:  CeleBREX Your last dose was: Your next dose is: Take 1 Cap by mouth daily for 30 days. 200 mg  
    
   
   
   
  
 oxyCODONE IR 5 mg immediate release tablet Commonly known as:  Sharin Res Your last dose was: Your next dose is: Take 1 Tab by mouth every four (4) hours as needed for Pain. Max Daily Amount: 30 mg.  
 5 mg  
    
   
   
   
  
 warfarin 2.5 mg tablet Commonly known as:  COUMADIN Your last dose was: Your next dose is: Take 1 Tab by mouth daily. 2.5 mg  
    
   
   
   
  
  
CONTINUE taking these medications Instructions Each Dose to Equal  
 Morning Noon Evening Bedtime  
 doxycycline 50 mg tablet Commonly known as:  ADOXA Your last dose was: Your next dose is: Take 50 mg by mouth daily. 50 mg FLOMAX 0.4 mg capsule Generic drug:  tamsulosin Your last dose was: Your next dose is: Take 0.4 mg by mouth daily. 0.4 mg  
    
   
   
   
  
 hydroCHLOROthiazide 12.5 mg tablet Commonly known as:  HYDRODIURIL Your last dose was: Your next dose is: Take 12.5 mg by mouth daily. 12.5 mg  
    
   
   
   
  
 MULTIVITAMIN PO Your last dose was: Your next dose is: Take  by mouth daily. nystatin powder Commonly known as:  MYCOSTATIN Your last dose was: Your next dose is:    
   
   
 Apply  to affected area as needed. VITAMIN B-12 INJECTION Your last dose was: Your next dose is:    
   
   
 by Injection route every month. VITAMIN D3 PO Your last dose was: Your next dose is: Take 1.25 mg by mouth every seven (7) days. WEDNESDAY  
 1.25 mg  
    
   
   
   
  
  
STOP taking these medications ALEVE 220 mg tablet Generic drug:  naproxen sodium Where to Get Your Medications Information on where to get these meds will be given to you by the nurse or doctor. ! Ask your nurse or doctor about these medications  
  celecoxib 200 mg capsule  
 oxyCODONE IR 5 mg immediate release tablet  
 warfarin 2.5 mg tablet Discharge Instructions Patient meets criteria for BUNDLED PAYMENT  
for Care Improvement Initiative Criteria Contact Information for Orthopedic Nurse Navigator:     
OG Chen, RN-BC 
W:[phone] R:[phone] C:[phone] DC Orders All Total Hips Case Management for DC planning to Home HC . - PT 2 times a week for 2 weeks; 50% WBAT with AVOID sudden and extreme movement of your hip (surgical leg). - PT/INR Every Monday/Thursday for 2 weeks, Call Dr. Rd Mitchell with results ([phone]). - Remove staples at 2 weeks post-op; 3/7/18 . - Follow up in Office in 2 1/2 weeks. After Hospital Care Plan:  Discharge Instructions Hip Replacement-Dr. Rd Mitchell Patient Name: Lynn Higginbotham Date of procedure: 2/21/2018 Procedure: Procedure(s): RIGHT TOTAL HIP ARTHROPLASTY Surgeon: Cooper Hernandez) and Role: 
   * Estefani Keyes MD - Primary PCP: Donal Calderon MD 
Date of discharge: No discharge date for patient encounter. Follow up appointments ? Follow up with Dr. Rd Mitchell in 2 ½ weeks. Call [phone] to make an appointment. ? If home health has been arranged for you the agency will contact you to arrange dates/times for visits. Please call them if you do not hear from them within 24 hours after you are discharged When to call your Orthopaedic Surgeon: Call [phone]. If you call after 5pm or on a weekend, the on call physician will be contacted ? Increased hip pain, unrelieved pain or if you have difficulty or are unable to walk ? Signs of infection-if your incision is red; continues to have drainage; drainage has a foul odor or if you have a persistent fever over 101 degrees ? Signs of a blood clot in your leg-calf pain, tenderness, redness, swelling of lower leg When to call your Primary Care Physician: 
? Concerns about medical conditions such as diabetes, high blood pressure, asthma, congestive heart failure ? Call if blood sugars are elevated, persistent headache or dizziness, coughing or congestion, constipation or diarrhea, burning with urination, abnormal heart rate When to call [id number] go to the nearest emergency room 
? acute onset of chest pain, shortness of breath, difficulty breathing Activity ? 50% weight bearing with walker or crutches for 2 weeks, then as tolerated. Refer to pages 23-33 of your handbook for instructions and pictures ? Complete you Home Exercise Program daily as instructed by your therapist. Refer to pages 36-42 of your handbook for instructions and pictures ? Get up every one hour and walk (except at night when sleeping) ? AVOID sudden and extreme movement of your hip (surgical leg) ? Do not drive or operate heavy machinery Incision Care ? The Aquacel (brown, waterproof) surgical dressing is to remain on your hip for 7 days. On the 7th day have someone gently peel the dressing off by carefully lifting the edge and stretching it slightly to break the adhesive seal 
? You will have staples in your hip incision. They will be removed by the home health agency staff ? If your Aquacel dressing comes loose/off before the 7th day, you may replace it with a dry sterile gauze dressing; change it daily. Once your incision is not draining, you may leave it open to air ? You may take a shower with the Aquacel dressing in place. Once the Aquacel is removed, you may shower and get your incision wet but do not submerge your incision under water in a bath tub, hot tub or swimming pool for 6 weeks after surgery. Preventing blood clots ? Take Coumadin as prescribed by Dr. Sandhya Love for one month following surgery ? Wear elastic stockings (TEDS) for 4 weeks. You should remove them for approximately 1 hour daily for showering/sponge bathing Pain management ? Take pain medication as prescribed; decrease the amount you use as your pain lessens ? Avoid alcoholic beverages while taking pain medication ? Please be aware that many medications contain Tylenol. We do not want you to over medicate so please read the information below as a guide. Do not take more than 4 Grams of Tylenol in a 24 hour period. (There are 1000 milligrams in one Gram) o Percocet contains 325 mg of Tylenol per tablet (do not take more than 12 tablets in 24 hours) 
o Lortab contains 500 mg of Tylenol per tablet (do not take more than 8 tablets in 24 hours) o Norco contains 325 mg of Tylenol per tablet (do not take more than 12 tablets in 24 hours). ? You may place an ice bag on your hip for 15-20 minutes after exercising and as needed throughout the day and night Diet ? Resume usual diet; drink plenty of fluids; eat foods high in fiber ? You may want to take a stool softener (such as Senokot-S or Colace) to prevent constipation while you are taking pain medication. If constipation occurs, take a laxative (such as Dulcolax tablets, Milk of Magnesia, or a suppository) Home Health Care Protocol (to be followed by [address] [place] [address]) Nursing-see physicians order ? Draw a PT/INR per physicians order. Call results to Dr. Beto Conde at [phone] ? Remove staples per physicians order ? Complete head to toe assessment, vital signs ? Medication reconciliation ? Review pain management ? Manage chronic medical conditions Physical Therapy-see physician's order Weight bearing status: 
Precautions at Admission: PWB, Fall (50%) ? Mobility Status: 
Supine to Sit:  (unable to assess due to low BP) Gait: 
  
  
  
  
 
ADL status overall composite: 
  
  
  
  
  
Physical Therapy ? Assessment and evaluation-bed mobility; functional transfers (bed, chair, bathroom, stairs); ambulation with equipment, car transfers, safety and ability to get out of house in the event of an emergency ? 50% weight bearing x 2 weeks then weight bearing as tolerated ? AVOID sudden and extreme movement of the hip (surgical leg) ? Discuss pain management ? Review how to do ADLs. Refer to pages 43-47 of handbook Home Exercise Program-refer to pages 36-42 of handbook Introducing [facility]! Jesus Arevalo introduces [employer] patient portal. Now you can access parts of your medical record, email your doctor's office, and request medication refills online. 1. In your internet browser, go to https://"[employer]. [employer]/"[employer] 2. Click on the First Time User? Click Here link in the Sign In box. You will see the New Member Sign Up page. 3. Enter your [employer] Access Code exactly as it appears below. You will not need to use this code after youve completed the sign-up process. If you do not sign up before the expiration date, you must request a new code. · [employer] Access Code: [email or web address] Expires: 5/16/2018  8:48 AM 
 
4. Enter the last four digits of your Social Security Number (xxxx) and Date of Birth (mm/dd/yyyy) as indicated and click Submit. You will be taken to the next sign-up page. 5. Create a Better Week[employer] ID. This will be your Better [employer] login ID and cannot be changed, so think of one that is secure and easy to remember. 6. Create a [employer] password. You can change your password at any time. 7. Enter your Password Reset Question and Answer. This can be used at a later time if you forget your password. 8. Enter your e-mail address. You will receive e-mail notification when new information is available in [address] 9. Click Sign Up. You can now view and download portions of your medical record. 10. Click the Download Summary menu link to download a portable copy of your medical information. If you have questions, please visit the Frequently Asked Questions section of the [employer] website. Remember, [employer] is NOT to be used for urgent needs. For medical emergencies, dial 911. Now available from your iPhone and Android! Providers Seen During Your Hospitalization Provider Specialty Primary office phone Christo Loza MD Orthopedic Surgery [phone] Your Primary Care Physician (PCP) Primary Care Physician Office Phone Office Fax Lilly Mendoza [phone] You are allergic to the following No active allergies Recent Documentation Height Weight BMI Smoking Status 1.702 m 67.4 kg 23.27 kg/m2 Former Smoker Emergency Contacts Name Discharge Info Relation Home Work Mobile Sujatha Quintero DISCHARGE CAREGIVER [3] Spouse [3] [phone] Patient Belongings The following personal items are in your possession at time of discharge: 
  Dental Appliances: None  Visual Aid: Glasses      Home Medications: None   Jewelry: None  Clothing:  (clothes, glasses, cane returned to pt in pacu)    Other Valuables: Eyeglasses (to pacu in plastic bag) Please provide this summary of care documentation to your next provider. Signatures-by signing, you are acknowledging that this After Visit Summary has been reviewed with you and you have received a copy. Patient Signature:  ____________________________________________________________ Date:  ____________________________________________________________  
  
[facility] Current Provider Signature:  ____________________________________________________________ Date:  ____________________________________________________________

## 2018-02-21 NOTE — PROGRESS NOTES
Problem: Mobility Impaired (Adult and Pediatric)  Goal: *Acute Goals and Plan of Care (Insert Text)  Physical Therapy Goals  Initiated 2/21/2018    1. Patient will move from supine to sit and sit to supine  in bed with modified independence within 4 days. 2. Patient will perform sit to stand with modified independence within 4 days. 3. Patient will ambulate with modified independence for 150 feet with the least restrictive device within 4 days. 4. Patient will ascend/descend 5 stairs with 1 handrail(s) with modified independence within 4 days. 5. Patient will perform CHAD home exercise program per protocol with modified independence within 4 days. physical Therapy EVALUATION  Patient: Alida Johns (69 y.o. male)  Date: 2/21/2018  Primary Diagnosis: DJD RIGHT HIP   Primary osteoarthritis of right hip  Procedure(s) (LRB):  RIGHT TOTAL HIP ARTHROPLASTY   (Right) Day of Surgery   Precautions:   PWB, Fall (50%)    ASSESSMENT :  Based on the objective data described below, the patient presents with decreased mobility after R CHAD POD0. Attempted to mobilize post operatively, but his BP is too low to attempt it at this time. Instructed in and performed isometrics and ankle pumps, but he still had BP of only 92/54 at rest in supine. His strength overall is good and he had no mobility issues prior to admission. He has a RW at home already and will need to manage only 4-5 steps to enter the home. His wife is at home and able to assist as needed. Expect he will progress well once he is able to tolerate mobility. Discussed with nursing, as well. .    Patient will benefit from skilled intervention to address the above impairments.   Patients rehabilitation potential is considered to be Good  Factors which may influence rehabilitation potential include:   []         None noted  []         Mental ability/status  [x]         Medical condition  []         Home/family situation and support systems  []         Safety awareness  []         Pain tolerance/management  []         Other:      PLAN :  Recommendations and Planned Interventions:  [x]           Bed Mobility Training             []    Neuromuscular Re-Education  [x]           Transfer Training                   []    Orthotic/Prosthetic Training  [x]           Gait Training                         []    Modalities  [x]           Therapeutic Exercises           []    Edema Management/Control  [x]           Therapeutic Activities            [x]    Patient and Family Training/Education  []           Other (comment):    Frequency/Duration: Patient will be followed by physical therapy  twice daily to address goals. Discharge Recommendations: Home Health  Further Equipment Recommendations for Discharge: none     SUBJECTIVE:   Patient stated I feel OK, a little lightheaded, I guess. 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    Chronic pain     Hypertension      Past Surgical History:   Procedure Laterality Date    HX APPENDECTOMY      HX CHOLECYSTECTOMY      HX COLOSTOMY  2013    COLOSTOMY    HX GI  1995    COLON RESECTION    HX TONSILLECTOMY      HX UROLOGICAL  2017    VAPORZATION     Prior Level of Function/Home Situation: independent  Personal factors and/or comorbidities impacting plan of care: low BP at this time, R CHAD         EXAMINATION/PRESENTATION/DECISION MAKING:   Critical Behavior:  Neurologic State: Alert  Orientation Level: Oriented X4  Cognition: Appropriate for age attention/concentration     Hearing:     Skin:  Post op dressing in place  Edema: none noted  Range Of Motion:  AROM: Generally decreased, functional                       Strength:    Strength: Generally decreased, functional                    Tone & Sensation:   Tone: Normal              Sensation: Intact               Coordination:  Coordination: Within functional limits  Vision:      Functional Mobility:  Bed Mobility:     Supine to Sit:  (unable to assess due to low BP)        Transfers:                             Balance:      Ambulation/Gait Training:                                                         Stairs: Therapeutic Exercises: Ankle pumps, quad sets, glut sets    Functional Measure:   Barthel Index:    Bathin (inferred)  Bladder: 0  Bowels: 10  Groomin  Dressin  Feeding: 10  Mobility: 0  Stairs: 0  Toilet Use: 0  Transfer (Bed to Chair and Back): 0  Total: 30       Barthel and G-code impairment scale:  Percentage of impairment CH  0% CI  1-19% CJ  20-39% CK  40-59% CL  60-79% CM  80-99% CN  100%   Barthel Score 0-100 100 99-80 79-60 59-40 20-39 1-19   0   Barthel Score 0-20 20 17-19 13-16 9-12 5-8 1-4 0      The Barthel ADL Index: Guidelines  1. The index should be used as a record of what a patient does, not as a record of what a patient could do. 2. The main aim is to establish degree of independence from any help, physical or verbal, however minor and for whatever reason. 3. The need for supervision renders the patient not independent. 4. A patient's performance should be established using the best available evidence. Asking the patient, friends/relatives and nurses are the usual sources, but direct observation and common sense are also important. However direct testing is not needed. 5. Usually the patient's performance over the preceding 24-48 hours is important, but occasionally longer periods will be relevant. 6. Middle categories imply that the patient supplies over 50 per cent of the effort. 7. Use of aids to be independent is allowed. Cooper Carrasco., Barthel, D.W. (2815). Functional evaluation: the Barthel Index. [address] [facility] (14)2. Jd Tran efrain SRIKANTH Gilman, Dasha Rodriguez., Rachel Klinefelter., Mindy, [address] ().  Measuring the change indisability after inpatient rehabilitation; comparison of the responsiveness of the Barthel Index and Functional [place] Measure. Journal of Neurology, Neurosurgery, and Psychiatry, 66(4), 467-452. GILMAR Corado, ASHLEIGH Espana, & Benja Tovar M.A. (2004.) Assessment of post-stroke quality of life in cost-effectiveness studies: The usefulness of the Barthel Index and the EuroQoL-5D. Quality of Life Research, 13, 720-41       G codes: In compliance with CMSs Claims Based Outcome Reporting, the following G-code set was chosen for this patient based on their primary functional limitation being treated: The outcome measure chosen to determine the severity of the functional limitation was the Barthel with a score of 30/100 which was correlated with the impairment scale. ? Mobility - Walking and Moving Around:     - CURRENT STATUS: CL - 60%-79% impaired, limited or restricted    - GOAL STATUS: CI - 1%-19% impaired, limited or restricted    - D/C STATUS:  ---------------To be determined---------------      Physical Therapy Evaluation Charge Determination   History Examination Presentation Decision-Making   MEDIUM  Complexity : 1-2 comorbidities / personal factors will impact the outcome/ POC  LOW Complexity : 1-2 Standardized tests and measures addressing body structure, function, activity limitation and / or participation in recreation  MEDIUM Complexity : Evolving with changing characteristics  LOW Complexity : FOTO score of       Based on the above components, the patient evaluation is determined to be of the following complexity level: LOW     Pain:  Pain Scale 1: Numeric (0 - 10)  Pain Intensity 1: 0  Pain Location 1: Hip  Pain Orientation 1: Right  Pain Description 1: Sore  Pain Intervention(s) 1: Rest;Repositioned  Activity Tolerance:   Poor at this time due to low BP  Please refer to the flowsheet for vital signs taken during this treatment.   After treatment:   []         Patient left in no apparent distress sitting up in chair  [x]         Patient left in no apparent distress in bed  [x]         Call bell left within reach  [x]         Nursing notified  []         Caregiver present  []         Bed alarm activated    COMMUNICATION/EDUCATION:   The patients plan of care was discussed with: Registered Nurse. [x]         Fall prevention education was provided and the patient/caregiver indicated understanding. [x]         Patient/family have participated as able in goal setting and plan of care. [x]         Patient/family agree to work toward stated goals and plan of care. []         Patient understands intent and goals of therapy, but is neutral about his/her participation. []         Patient is unable to participate in goal setting and plan of care.     Thank you for this referral.  Ashleigh Cat, PT   Time Calculation: 20 mins

## 2018-02-21 NOTE — PERIOP NOTES
Dr. Francisco Cole made aware that there was no history and physical update in chart. He instructed the CRNA Ladmargareth Kraus to go ahead and take patient back to surgery. He stated \"his PA would take care of it. \"

## 2018-02-21 NOTE — PROGRESS NOTES
Primary Nurse Regina Rodriguez RN and Army Elvira RN performed a dual skin assessment on this patient No impairment noted  Sukhwinder score is

## 2018-02-22 ENCOUNTER — HOME HEALTH ADMISSION (OUTPATIENT)
Dept: HOME HEALTH SERVICES | Facility: HOME HEALTH | Age: 78
End: 2018-02-22
Payer: MEDICARE

## 2018-02-22 LAB
ANION GAP SERPL CALC-SCNC: 6 MMOL/L (ref 5–15)
BUN SERPL-MCNC: 10 MG/DL (ref 6–20)
BUN/CREAT SERPL: 9 (ref 12–20)
CALCIUM SERPL-MCNC: 7.8 MG/DL (ref 8.5–10.1)
CHLORIDE SERPL-SCNC: 110 MMOL/L (ref 97–108)
CO2 SERPL-SCNC: 26 MMOL/L (ref 21–32)
CREAT SERPL-MCNC: 1.11 MG/DL (ref 0.7–1.3)
GLUCOSE SERPL-MCNC: 89 MG/DL (ref 65–100)
HGB BLD-MCNC: 9.1 G/DL (ref 12.1–17)
INR PPP: 1.5 (ref 0.9–1.1)
POTASSIUM SERPL-SCNC: 3.5 MMOL/L (ref 3.5–5.1)
PROTHROMBIN TIME: 15.6 SEC (ref 9–11.1)
SODIUM SERPL-SCNC: 142 MMOL/L (ref 136–145)

## 2018-02-22 PROCEDURE — 36415 COLL VENOUS BLD VENIPUNCTURE: CPT | Performed by: PHYSICIAN ASSISTANT

## 2018-02-22 PROCEDURE — G8987 SELF CARE CURRENT STATUS: HCPCS

## 2018-02-22 PROCEDURE — 65270000029 HC RM PRIVATE

## 2018-02-22 PROCEDURE — 74011250637 HC RX REV CODE- 250/637: Performed by: PHYSICIAN ASSISTANT

## 2018-02-22 PROCEDURE — 97116 GAIT TRAINING THERAPY: CPT

## 2018-02-22 PROCEDURE — 97535 SELF CARE MNGMENT TRAINING: CPT

## 2018-02-22 PROCEDURE — 74011250637 HC RX REV CODE- 250/637: Performed by: NURSE PRACTITIONER

## 2018-02-22 PROCEDURE — 97165 OT EVAL LOW COMPLEX 30 MIN: CPT

## 2018-02-22 PROCEDURE — 80048 BASIC METABOLIC PNL TOTAL CA: CPT | Performed by: PHYSICIAN ASSISTANT

## 2018-02-22 PROCEDURE — 85018 HEMOGLOBIN: CPT | Performed by: PHYSICIAN ASSISTANT

## 2018-02-22 PROCEDURE — 85610 PROTHROMBIN TIME: CPT | Performed by: PHYSICIAN ASSISTANT

## 2018-02-22 PROCEDURE — G8988 SELF CARE GOAL STATUS: HCPCS

## 2018-02-22 PROCEDURE — 97530 THERAPEUTIC ACTIVITIES: CPT

## 2018-02-22 PROCEDURE — 74011250636 HC RX REV CODE- 250/636: Performed by: PHYSICIAN ASSISTANT

## 2018-02-22 RX ORDER — WARFARIN 2.5 MG/1
2.5 TABLET ORAL DAILY
Qty: 90 TAB | Refills: 1 | Status: SHIPPED | OUTPATIENT
Start: 2018-02-22 | End: 2021-03-10 | Stop reason: ALTCHOICE

## 2018-02-22 RX ORDER — OXYCODONE HYDROCHLORIDE 5 MG/1
5 TABLET ORAL
Qty: 60 TAB | Refills: 0 | Status: SHIPPED | OUTPATIENT
Start: 2018-02-22 | End: 2021-03-10 | Stop reason: ALTCHOICE

## 2018-02-22 RX ORDER — CELECOXIB 200 MG/1
200 CAPSULE ORAL DAILY
Qty: 30 CAP | Refills: 0 | Status: SHIPPED | OUTPATIENT
Start: 2018-02-22 | End: 2018-03-24

## 2018-02-22 RX ADMIN — ACETAMINOPHEN 650 MG: 325 TABLET, FILM COATED ORAL at 01:02

## 2018-02-22 RX ADMIN — ACETAMINOPHEN 650 MG: 325 TABLET, FILM COATED ORAL at 06:27

## 2018-02-22 RX ADMIN — DOXYCYCLINE HYCLATE 50 MG: 100 TABLET, COATED ORAL at 18:36

## 2018-02-22 RX ADMIN — SODIUM CHLORIDE 125 ML/HR: 900 INJECTION, SOLUTION INTRAVENOUS at 03:54

## 2018-02-22 RX ADMIN — ACETAMINOPHEN 650 MG: 325 TABLET, FILM COATED ORAL at 20:16

## 2018-02-22 RX ADMIN — TAMSULOSIN HYDROCHLORIDE 0.8 MG: 0.4 CAPSULE ORAL at 18:07

## 2018-02-22 RX ADMIN — DOCUSATE SODIUM AND SENNOSIDES 1 TABLET: 8.6; 5 TABLET, FILM COATED ORAL at 09:27

## 2018-02-22 RX ADMIN — CELECOXIB 200 MG: 200 CAPSULE ORAL at 09:27

## 2018-02-22 RX ADMIN — Medication 10 ML: at 20:17

## 2018-02-22 RX ADMIN — CELECOXIB 200 MG: 200 CAPSULE ORAL at 20:16

## 2018-02-22 RX ADMIN — FAMOTIDINE 20 MG: 20 TABLET, FILM COATED ORAL at 09:27

## 2018-02-22 RX ADMIN — ACETAMINOPHEN 650 MG: 325 TABLET, FILM COATED ORAL at 14:10

## 2018-02-22 NOTE — PROGRESS NOTES
Cm reviewed chart and noted that patient has total left hip replacement yesterday. He has medical hx including arthritis, hypertension and cancer. His PCP is Dr Uriah Portillo and last office visit was 6 months ago. No AMD in chart. CM met with patient and wife, Willette Nyhan,  in his room to introduce self and explain role. Patient was alert and oriented during visit. He lives with his wife Willette Nyhan [phone], in their 2 level  Home. He will remain on first level. Patient was self care and independent prior to admission. He has RW for home use. Patient is retired--  in [place]. Wife is retired. Patient is in agreement with [facility] and would like to use [facility] [phone]. Referral sent via [facility] [facility] and accepted. Name and number of agency placed on discharge instructions and patient advised to call agency if not contacted by noon the day discharge to inquire as to the day and time of initial visit. [place] of choice letter completed and placed in chart. CM will follow and assist with any other needs that arise. . Wife will transport home. Care Management Interventions  PCP Verified by CM: Yes (last office visit 6 months ago)  Transition of Care Consult (CM Consult): [address]: Yes  Discharge Durable Medical Equipment: No  Physical Therapy Consult: Yes  Occupational Therapy Consult: Yes  Current Support Network: Lives with Spouse (lives with wife in two level home but will remain on first level. independent and self care prior to admission. wife will be home with him when discharged.   No AMD in chart.  )  Plan discussed with Pt/Family/Caregiver: Yes  Freedom of Choice Offered: Yes  Discharge Location  Discharge Placement: Home with home health

## 2018-02-22 NOTE — PROGRESS NOTES
Problem: Mobility Impaired (Adult and Pediatric)  Goal: *Acute Goals and Plan of Care (Insert Text)  Physical Therapy Goals  Initiated 2/21/2018    1. Patient will move from supine to sit and sit to supine  in bed with modified independence within 4 days. 2. Patient will perform sit to stand with modified independence within 4 days. 3. Patient will ambulate with modified independence for 150 feet with the least restrictive device within 4 days. 4. Patient will ascend/descend 5 stairs with 1 handrail(s) with modified independence within 4 days. 5. Patient will perform CHAD home exercise program per protocol with modified independence within 4 days. physical Therapy Twice daily TREATMENT  Patient: Roseline Casper (43 y.o. male)  Date: 2/22/2018  Diagnosis: DJD RIGHT HIP   Primary osteoarthritis of right hip Primary osteoarthritis of right hip  Procedure(s) (LRB):  RIGHT TOTAL HIP ARTHROPLASTY   (Right) 1 Day Post-Op  Precautions: PWB, Fall  Chart, physical therapy assessment, plan of care and goals were reviewed. ASSESSMENT:  Patient treated twice today per pathway and was able to progress his activity very well in both morning and afternoon sessions. He was able to maintain stable VS throughout activity and only reported pain in the afternoon session. Reviewed activity recommendations and restrictions. Expect he will continue to progress his mobility well and be able to return home with HHPT and wife assist, possibly tomorrow after morning session. .    Progression toward goals:  [x]      Improving appropriately and progressing toward goals  []      Improving slowly and progressing toward goals  []      Not making progress toward goals and plan of care will be adjusted     PLAN:  Patient continues to benefit from skilled intervention to address the above impairments. Continue treatment per established plan of care.   Discharge Recommendations:  Home Health  Further Equipment Recommendations for Discharge: none     SUBJECTIVE:   No new complaints, some pain in the afternoon session, but able to mobilize    OBJECTIVE DATA SUMMARY:   Functional Mobility Training:  Bed Mobility:     Supine to Sit: Minimum assistance; Additional time              Transfers:  Sit to Stand: Contact guard assistance; Adaptive equipment; Additional time  Stand to Sit: Contact guard assistance; Adaptive equipment; Additional time                             Ambulation/Gait Training:  Distance (ft): 130 Feet (ft)  Assistive Device: Gait belt;Walker, rolling  Ambulation - Level of Assistance: Contact guard assistance; Adaptive equipment; Additional time        Gait Abnormalities: Antalgic;Decreased step clearance; Step to gait  Right Side Weight Bearing: Partial (%) (50)  Left Side Weight Bearing: Full  Base of Support: Widened;Shift to left  Stance: Right decreased  Speed/Keyla: Pace decreased (<100 feet/min)  Step Length: Left shortened;Right shortened  Swing Pattern: Right asymmetrical     Interventions: Safety awareness training; Tactile cues; Verbal cues; Visual/Demos           Stairs: Therapeutic Exercises:   Exercises performed per protocol. See morning treatment note for description. Pain:  Pain Scale 1: Numeric (0 - 10)  Pain Intensity 1: 2  Pain Location 1: Hip  Pain Orientation 1: Right  Pain Description 1: Sore  Pain Intervention(s) 1: Repositioned; Ice  Activity Tolerance:   Good with stable VS  Please refer to the flowsheet for vital signs taken during this treatment.   After treatment:   [] Patient left in no apparent distress sitting up in chair  [x] Patient left in no apparent distress in bed  [x] Call bell left within reach  [x] Nursing notified  [] Caregiver present  [] Bed alarm activated    COMMUNICATION/COLLABORATION:   The patients plan of care was discussed with: Occupational Therapist, Registered Nurse and     Antonietta Wall, PT   Time Calculation: 24 mins

## 2018-02-22 NOTE — PROGRESS NOTES
Problem: Discharge Planning  Goal: *Discharge to safe environment  Outcome: Progressing Towards Goal  Home with wife and home health

## 2018-02-22 NOTE — PROGRESS NOTES
Problem: Self Care Deficits Care Plan (Adult)  Goal: *Acute Goals and Plan of Care (Insert Text)  Occupational Therapy Goals  Initiated: 2/22/2018    1. Patient will perform grooming with supervision/set-up standing at sink within 3 day(s). 2.  Patient will perform bathing with supervision/set-up from chair within 3 day(s). 3.  Patient will perform upper body dressing and lower body dressing with supervision/set-up within 3 day(s). 4.  Patient will perform toilet transfers with supervision/set-up within 3 day(s). 5.  Patient will perform all aspects of toileting with supervision/set-up within 3 day(s). 6.  Patient will be independent with hip precautions within 3 days. Occupational Therapy EVALUATION  Patient: Mago Martinez (3 y.o. male)  Date: 2/22/2018  Primary Diagnosis: DJD RIGHT HIP   Primary osteoarthritis of right hip  Procedure(s) (LRB):  RIGHT TOTAL HIP ARTHROPLASTY   (Right) 1 Day Post-Op   Precautions:   Fall, PWB, Total hip (avoid sudden and extreme movements)    ASSESSMENT :  Based on the objective data described below, the patient presents with decreased independence with all self care and functional mobility following admission for R THR. Pt was able to progress from chair to bathroom with maximal encouragement and education. Pt wanting to empty his ostomy bag but reports he generally drains it from standing in bathroom. mobilized with pt to bathroom and educated pt for standing using walker by commode to continue to have support for PWB status. Pt was able to empty ostomy without difficulty. Pt noted with bending right knee and \"dipping\" in order to drain bag. Pt did well with tasks but will need to reinforce good alignment with tasks. Recommend progression towards home with continued training and education. Patient will benefit from skilled intervention to address the above impairments.   Patients rehabilitation potential is considered to be Good  Factors which may influence rehabilitation potential include:   [x]             None noted  []             Mental ability/status  []             Medical condition  []             Home/family situation and support systems  []             Safety awareness  []             Pain tolerance/management  []             Other:      PLAN :  Recommendations and Planned Interventions:  [x]               Self Care Training                  [x]        Therapeutic Activities  [x]               Functional Mobility Training    []        Cognitive Retraining  [x]               Therapeutic Exercises           [x]        Endurance Activities  [x]               Balance Training                   []        Neuromuscular Re-Education  []               Visual/Perceptual Training     [x]   Home Safety Training  [x]               Patient Education                 [x]        Family Training/Education  []               Other (comment):    Frequency/Duration: Patient will be followed by occupational therapy 5 times a week to address goals. Discharge Recommendations: None  Further Equipment Recommendations for Discharge: TBD     SUBJECTIVE:   Patient stated I am feeling pretty good today.     OBJECTIVE DATA SUMMARY:   HISTORY:   Past Medical History:   Diagnosis Date    Arthritis     Cancer ([facility] [address]     COLO-RECTAL    Chronic pain     Hypertension      Past Surgical History:   Procedure Laterality Date    HX APPENDECTOMY      HX CHOLECYSTECTOMY      HX COLOSTOMY  2013    COLOSTOMY    HX GI  1995    COLON RESECTION    HX TONSILLECTOMY      HX UROLOGICAL  2017    VAPORZATION       Prior Level of Function/Environment/Context: pt was independent at home. He lives with his wife who will assist him as needed at home.    Expanded or extensive additional review of patient history:     Home Situation  Home Environment: Private residence  # Steps to Enter: 5  Rails to Enter: Yes  Hand Rails : Bilateral  One/Two Story Residence: Two story  Living Alone: No  Support Systems: Child(shlomo)  Patient Expects to be Discharged to[de-identified] Private residence  Current DME Used/Available at Home: Adaptive dressing aides  Tub or Shower Type: Shower (build in shower seat)  [x]  Right hand dominant   []  Left hand dominant    EXAMINATION OF PERFORMANCE DEFICITS:  Cognitive/Behavioral Status:  Neurologic State: Alert  Orientation Level: Oriented X4  Cognition: Appropriate for age attention/concentration  Perception: Appears intact  Perseveration: No perseveration noted  Safety/Judgement: Good awareness of safety precautions    Skin: see nursing notes    Edema: none noted    Hearing: Auditory  Auditory Impairment: Hard of hearing, bilateral    Vision/Perceptual:                           Acuity: Within Defined Limits    Corrective Lenses: Glasses    Range of Motion:    AROM: Within functional limits  PROM: Within functional limits                      Strength:    Strength: Within functional limits                Coordination:  Coordination: Within functional limits  Fine Motor Skills-Upper: Right Intact; Left Intact    Gross Motor Skills-Upper: Right Intact; Left Intact    Tone & Sensation:    Tone: Normal  Sensation: Intact                      Balance:  Sitting: Intact; Without support  Standing: Intact; With support    Functional Mobility and Transfers for ADLs:  Bed Mobility:  Supine to Sit:  (recieved in chair)  Sit to Supine: Minimum assistance    Transfers:  Sit to Stand: Minimum assistance  Stand to Sit: Minimum assistance  Bed to Chair: Minimum assistance  Toilet Transfer : Minimum assistance    ADL Assessment:  Feeding: Supervision    Oral Facial Hygiene/Grooming: Supervision    Bathing: Moderate assistance    Upper Body Dressing: Supervision    Lower Body Dressing: Maximum assistance    Toileting: Moderate assistance (educated for standing to empty ostomy bag)                ADL Intervention and task modifications:   pt received in chair and progress to bathroom as noted above.   Pt educated for safe techniques to complete toileting tasks as well as for mobility skills. Pd did well with all tasks overall. Pt returned to supine in bed and provided ice following all activity. Cognitive Retraining  Safety/Judgement: Good awareness of safety precautions    Functional Measure:  Barthel Index:    Bathin  Bladder: 10  Bowels: 0 (ostomy)  Groomin  Dressin  Feeding: 10  Mobility: 5  Stairs: 0  Toilet Use: 5  Transfer (Bed to Chair and Back): 10  Total: 50       Barthel and G-code impairment scale:  Percentage of impairment CH  0% CI  1-19% CJ  20-39% CK  40-59% CL  60-79% CM  80-99% CN  100%   Barthel Score 0-100 100 99-80 79-60 59-40 20-39 1-19   0   Barthel Score 0-20 20 17-19 13-16 9-12 5-8 1-4 0      The Barthel ADL Index: Guidelines  1. The index should be used as a record of what a patient does, not as a record of what a patient could do. 2. The main aim is to establish degree of independence from any help, physical or verbal, however minor and for whatever reason. 3. The need for supervision renders the patient not independent. 4. A patient's performance should be established using the best available evidence. Asking the patient, friends/relatives and nurses are the usual sources, but direct observation and common sense are also important. However direct testing is not needed. 5. Usually the patient's performance over the preceding 24-48 hours is important, but occasionally longer periods will be relevant. 6. Middle categories imply that the patient supplies over 50 per cent of the effort. 7. Use of aids to be independent is allowed. Agata Francis., Barthel, D.W. ([id number]). Functional evaluation: the Barthel Index. [address] [facility] (14)2. Daleen Fend efrain [place], J.J.M.F, Onesimo Plata., Andree Taylor., Dayanara Becker, [address] ().  Measuring the change indisability after inpatient rehabilitation; comparison of the responsiveness of the Barthel Index and Functional [place] Measure. Journal of Neurology, Neurosurgery, and Psychiatry, 66(4), 141-914. GILMAR Maharaj, ASHLEIGH [place], & Tess Edwards M.A. (2004.) Assessment of post-stroke quality of life in cost-effectiveness studies: The usefulness of the Barthel Index and the EuroQoL-5D. Quality of Life Research, 13, 346-76     G codes: In compliance with CMSs Claims Based Outcome Reporting, the following G-code set was chosen for this patient based on their primary functional limitation being treated: The outcome measure chosen to determine the severity of the functional limitation was the Barthel Index with a score of 50/100 which was correlated with the impairment scale. ? Self Care:     - CURRENT STATUS: CK - 40%-59% impaired, limited or restricted    - GOAL STATUS: CI - 1%-19% impaired, limited or restricted    - D/C STATUS:  ---------------To be determined---------------     Occupational Therapy Evaluation Charge Determination   History Examination Decision-Making   LOW Complexity : Brief history review  MEDIUM Complexity : 3-5 performance deficits relating to physical, cognitive , or psychosocial skils that result in activity limitations and / or participation restrictions MEDIUM Complexity : Patient may present with comorbidities that affect occupational performnce. Miniml to moderate modification of tasks or assistance (eg, physical or verbal ) with assesment(s) is necessary to enable patient to complete evaluation       Based on the above components, the patient evaluation is determined to be of the following complexity level: LOW   Pain:  Pain Scale 1: Numeric (0 - 10)  Pain Intensity 1: 2  Pain Location 1: Hip  Pain Orientation 1: Right  Pain Description 1: Sore  Pain Intervention(s) 1: Repositioned; Ice  Activity Tolerance:   VSS throughout session. Please refer to the flowsheet for vital signs taken during this treatment.   After treatment:   [] Patient left in no apparent distress sitting up in chair  [x] Patient left in no apparent distress in bed  [x] Call bell left within reach  [x] Nursing notified  [] Caregiver present  [] Bed alarm activated    COMMUNICATION/EDUCATION:   The patients plan of care was discussed with: Physical Therapist and Registered Nurse. [x] Home safety education was provided and the patient/caregiver indicated understanding. [x] Patient/family have participated as able in goal setting and plan of care. [] Patient/family agree to work toward stated goals and plan of care. [] Patient understands intent and goals of therapy, but is neutral about his/her participation. [] Patient is unable to participate in goal setting and plan of care. This patients plan of care is appropriate for delegation to [facility].     Thank you for this referral.  Rose Gupta OT  Time Calculation: 21 mins

## 2018-02-22 NOTE — PROGRESS NOTES
TOTAL HIP PROGRESS NOTE      Subjective:     Post-Operative Day: 1 Status Post right Total Hip Arthroplasty  Systemic or Specific Complaints:No Complaints    Objective:     Patient Vitals for the past 24 hrs:   BP Temp Pulse Resp SpO2 Weight   02/22/18 0936 120/65 - 71 - - -   02/22/18 0930 112/63 - 80 - - -   02/22/18 0810 102/64 98.3 °F (36.8 °C) 69 16 100 % -   02/22/18 0624 - - - - - 67.4 kg (148 lb 9.6 oz)   02/22/18 0342 91/46 98 °F (36.7 °C) 77 16 99 % -   02/21/18 2341 91/45 98.4 °F (36.9 °C) 85 16 98 % -   02/21/18 1710 97/59 97.4 °F (36.3 °C) 73 16 100 % -   02/21/18 1538 95/53 97.7 °F (36.5 °C) 79 15 100 % -   02/21/18 1431 92/52 97.8 °F (36.6 °C) 76 16 100 % -   02/21/18 1354 92/56 - 71 - - -   02/21/18 1333 100/52 97.5 °F (36.4 °C) 70 18 100 % -   02/21/18 1230 106/57 95.5 °F (35.3 °C) 77 16 100 % -   02/21/18 1205 - - 80 15 98 % -   02/21/18 1203 105/51 97.6 °F (36.4 °C) - - - -   02/21/18 1200 105/51 - 75 10 97 % -   02/21/18 1145 96/52 - 82 16 99 % -   02/21/18 1130 97/51 - 68 11 97 % -   02/21/18 1115 91/47 - 72 13 100 % -   02/21/18 1100 95/53 - 66 9 100 % -   02/21/18 1045 97/52 - 70 8 100 % -   02/21/18 1040 - - 67 8 100 % -   02/21/18 1035 - - 73 11 100 % -   02/21/18 1030 (!) 87/50 - 76 11 100 % -   02/21/18 1025 - - 76 11 100 % -   02/21/18 1020 - - 83 15 100 % -   02/21/18 1015 (!) 89/52 - 83 8 99 % -   02/21/18 1010 (!) 83/47 - 87 9 99 % -   02/21/18 1005 90/45 - 88 9 98 % -   02/21/18 1003 (!) 87/50 - 91 10 98 % -   02/21/18 1000 (!) 87/50 - 92 (!) 5 99 % -       General: alert, cooperative, no distress, appears stated age   Wound: Wound clean and dry no evidence of infection. , No Erythema, No Edema, No Drainage and Wound Intact   Motion: Painless Range of Motion   DVT Exam: No evidence of DVT seen on physical exam.  Negative Deyvi's sign. No cords or calf tenderness. No significant calf/ankle edema.      Data Review:    Recent Results (from the past 24 hour(s))   METABOLIC PANEL, BASIC Collection Time: 02/22/18  4:03 AM   Result Value Ref Range    Sodium 142 136 - 145 mmol/L    Potassium 3.5 3.5 - 5.1 mmol/L    Chloride 110 (H) 97 - 108 mmol/L    CO2 26 21 - 32 mmol/L    Anion gap 6 5 - 15 mmol/L    Glucose 89 65 - 100 mg/dL    BUN 10 6 - 20 MG/DL    Creatinine 1.11 0.70 - 1.30 MG/DL    BUN/Creatinine ratio 9 (L) 12 - 20      GFR est AA >60 >60 ml/min/1.73m2    GFR est non-AA >60 >60 ml/min/1.73m2    Calcium 7.8 (L) 8.5 - 10.1 MG/DL   HEMOGLOBIN    Collection Time: 02/22/18  4:03 AM   Result Value Ref Range    HGB 9.1 (L) 12.1 - 17.0 g/dL   PROTHROMBIN TIME + INR    Collection Time: 02/22/18  4:03 AM   Result Value Ref Range    INR 1.5 (H) 0.9 - 1.1      Prothrombin time 15.6 (H) 9.0 - 11.1 sec         Assessment:     Status Post right Total Hip Arthroplasty. Doing well postoperatively. .  Bandage aquacell, clean/dry. Labs stable. PT progressing well. Pain control WNL. Blood pressures improved. Plan:     Continues current post-op course  Continue PT per protocol.   Will follow for discharge decision making

## 2018-02-22 NOTE — PROGRESS NOTES
Bedside and Verbal shift change report given to Tony Martin (oncoming nurse) by Keiry De Leon (offgoing nurse). Report included the following information SBAR, Kardex, OR Summary, Intake/Output and MAR.     0743 Bedside and Verbal shift change report given to letty VILLEDA (oncoming nurse) by Tony Martin (offgoing nurse). Report included the following information SBAR, Kardex, OR Summary, Intake/Output, MAR and Recent Results.

## 2018-02-23 VITALS
TEMPERATURE: 98.1 F | HEART RATE: 76 BPM | DIASTOLIC BLOOD PRESSURE: 64 MMHG | OXYGEN SATURATION: 100 % | WEIGHT: 148.6 LBS | SYSTOLIC BLOOD PRESSURE: 107 MMHG | HEIGHT: 67 IN | RESPIRATION RATE: 16 BRPM | BODY MASS INDEX: 23.32 KG/M2

## 2018-02-23 LAB
HGB BLD-MCNC: 9.2 G/DL (ref 12.1–17)
INR PPP: 2.2 (ref 0.9–1.1)
PROTHROMBIN TIME: 22.1 SEC (ref 9–11.1)

## 2018-02-23 PROCEDURE — 97530 THERAPEUTIC ACTIVITIES: CPT

## 2018-02-23 PROCEDURE — 85610 PROTHROMBIN TIME: CPT | Performed by: PHYSICIAN ASSISTANT

## 2018-02-23 PROCEDURE — 74011250637 HC RX REV CODE- 250/637: Performed by: NURSE PRACTITIONER

## 2018-02-23 PROCEDURE — 36415 COLL VENOUS BLD VENIPUNCTURE: CPT | Performed by: PHYSICIAN ASSISTANT

## 2018-02-23 PROCEDURE — 97535 SELF CARE MNGMENT TRAINING: CPT

## 2018-02-23 PROCEDURE — 74011250637 HC RX REV CODE- 250/637: Performed by: PHYSICIAN ASSISTANT

## 2018-02-23 PROCEDURE — 85018 HEMOGLOBIN: CPT | Performed by: PHYSICIAN ASSISTANT

## 2018-02-23 PROCEDURE — 97116 GAIT TRAINING THERAPY: CPT

## 2018-02-23 RX ADMIN — FAMOTIDINE 20 MG: 20 TABLET, FILM COATED ORAL at 09:38

## 2018-02-23 RX ADMIN — HYDROCHLOROTHIAZIDE 12.5 MG: 25 TABLET ORAL at 09:38

## 2018-02-23 RX ADMIN — ACETAMINOPHEN 650 MG: 325 TABLET, FILM COATED ORAL at 09:38

## 2018-02-23 RX ADMIN — CELECOXIB 200 MG: 200 CAPSULE ORAL at 09:38

## 2018-02-23 RX ADMIN — ACETAMINOPHEN 650 MG: 325 TABLET, FILM COATED ORAL at 15:01

## 2018-02-23 NOTE — PROGRESS NOTES
I have reviewed discharge instructions with the patient and spouse. The patient and spouse verbalized understanding. I have reviewed discharge instructions with the patient and spouse. The patient and spouse verbalized understanding. Patient being discharge with a copy of the discharge instructions, 2 prescriptions, personal belongings, and extra bandage supplies. He was wheeled down to pt discharge by volunteers and will be driven home by his wife.

## 2018-02-23 NOTE — PROGRESS NOTES
Bedside and Verbal shift change report given to Marion OrtizRN (oncoming nurse) by Kwaku He RN (offgoing nurse). Report given with SBAR and Kardex.

## 2018-02-23 NOTE — PROGRESS NOTES
Problem: Falls - Risk of  Goal: *Absence of Falls  Document Jian Fall Risk and appropriate interventions in the flowsheet.    Outcome: Progressing Towards Goal  Fall Risk Interventions:  Mobility Interventions: PT Consult for mobility concerns, PT Consult for assist device competence    Mentation Interventions: Evaluate medications/consider consulting pharmacy    Medication Interventions: Evaluate medications/consider consulting pharmacy    Elimination Interventions: Call light in reach

## 2018-02-23 NOTE — PROGRESS NOTES
Problem: Self Care Deficits Care Plan (Adult)  Goal: *Acute Goals and Plan of Care (Insert Text)  Occupational Therapy Goals  Initiated: 2/22/2018    1. Patient will perform grooming with supervision/set-up standing at sink within 3 day(s). 2.  Patient will perform bathing with supervision/set-up from chair within 3 day(s). 3.  Patient will perform upper body dressing and lower body dressing with supervision/set-up within 3 day(s). 4.  Patient will perform toilet transfers with supervision/set-up within 3 day(s). 5.  Patient will perform all aspects of toileting with supervision/set-up within 3 day(s). 6.  Patient will be independent with hip precautions within 3 days. Occupational Therapy TREATMENT  Patient: Conchita Looney (76 y.o. male)  Date: 2/23/2018  Diagnosis: DJD RIGHT HIP   Primary osteoarthritis of right hip Primary osteoarthritis of right hip  Procedure(s) (LRB):  RIGHT TOTAL HIP ARTHROPLASTY   (Right) 2 Days Post-Op  Precautions: Fall, PWB, Total hip (avoid sudden and extreme movements)  Chart, occupational therapy assessment, plan of care, and goals were reviewed. ASSESSMENT:  Pt cleared by nursing and worked with rehab tech to complete lower body dressing with AE. Assumed care of patient for mobility and discussion of toileting and toilet safety with THR. Pt is able to complete ostomy management unassisted standing in bathroom. Discussed keeping legs neutral with slight flexion to complete tasks. Pt will have support and assistance for home. Progression toward goals:  [x]       Improving appropriately and progressing toward goals  []       Improving slowly and progressing toward goals  []       Not making progress toward goals and plan of care will be adjusted     PLAN:  Patient continues to benefit from skilled intervention to address the above impairments. Continue treatment per established plan of care.   Discharge Recommendations:  None  Further Equipment Recommendations for Discharge:  none     SUBJECTIVE:   Patient stated I am feeling really good.     OBJECTIVE DATA SUMMARY:   Cognitive/Behavioral Status:  Neurologic State: Alert  Orientation Level: Oriented X4  Cognition: Appropriate for age attention/concentration  Perception: Appears intact  Perseveration: No perseveration noted  Safety/Judgement: Good awareness of safety precautions    Functional Mobility and Transfers for ADLs:  Bed Mobility:  Supine to Sit: Modified independent  Sit to Supine: Modified independent; Adaptive equipment; Additional time (instructed in use of strap to assist)    Transfers:  Sit to Stand: Modified independent; Adaptive equipment; Additional time  Functional Transfers  Bathroom Mobility: Supervision/set up    Balance:  Sitting: Intact  Standing: Intact; With support    ADL Intervention:     Toileting education provided for safety with completing toileting activities. Pt did well with tasks and reports he has no further concerns for tasks. Pt is doing well and will have help for home. Toileting  Toileting Assistance: Supervision/set up  Bladder Hygiene: Supervision/set-up  Bowel Hygiene: Supervision/set-up  Clothing Management: Supervision/set-up    Cognitive Retraining  Safety/Judgement: Good awareness of safety precautions    Pain:  Pain Scale 1: Numeric (0 - 10)  Pain Intensity 1: 0              Activity Tolerance:   VSS throughout session.      After treatment:   [x] Patient left in no apparent distress sitting up in chair  [] Patient left in no apparent distress in bed  [x] Call bell left within reach  [x] Nursing notified  [] Caregiver present  [] Bed alarm activated    COMMUNICATION/COLLABORATION:   The patients plan of care was discussed with: Physical Therapist and Registered Nurse    Gilda Newman OT  Time Calculation: 10 mins

## 2018-02-23 NOTE — ROUTINE PROCESS
Bedside shift change report given to 45 Talia Sinha (oncoming nurse) by Marion Ortiz (offgoing nurse). Report included the following information SBAR.

## 2018-02-23 NOTE — DISCHARGE INSTRUCTIONS
Patient meets criteria for   BUNDLED PAYMENT   for Care Improvement Initiative Criteria    Contact Information for Orthopedic Nurse Navigator:      OG Fry, RN-BC  H:[phone]  X:[phone]  C:[phone]    DC Orders All Total Hips    Case Management for DC planning to Home HC .   - PT 2 times a week for 2 weeks; 50% WBAT with AVOID sudden and extreme movement of your hip (surgical leg). - PT/INR Every Monday/Thursday for 2 weeks, Call Dr. Dre Dow with results ([phone]). - Remove staples at 2 weeks post-op; 3/7/18 .   - Follow up in Office in 2 1/2 weeks. After Hospital Care Plan:  Discharge Instructions Hip Replacement-Dr. Dre Dow    Patient Name: Deisi Coley  Date of procedure: 2/21/2018    Procedure: Procedure(s):  RIGHT TOTAL HIP ARTHROPLASTY    Surgeon: Surgeon(s) and Role:     * Nakita Cramer MD - Primary     PCP: Yonatan Gonzalez MD  Date of discharge: No discharge date for patient encounter. Follow up appointments   Follow up with Dr. Dre Dow in 2 ½ weeks. Call [phone] to make an appointment.  If home health has been arranged for you the agency will contact you to arrange dates/times for visits. Please call them if you do not hear from them within 24 hours after you are discharged    When to call your Orthopaedic Surgeon: Call [phone].  If you call after 5pm or on a weekend, the on call physician will be contacted   Increased hip pain, unrelieved pain or if you have difficulty or are unable to walk   Signs of infection-if your incision is red; continues to have drainage; drainage has a foul odor or if you have a persistent fever over 101 degrees   Signs of a blood clot in your leg-calf pain, tenderness, redness, swelling of lower leg    When to call your Primary Care Physician:   Concerns about medical conditions such as diabetes, high blood pressure, asthma, congestive heart failure   Call if blood sugars are elevated, persistent headache or dizziness, coughing or congestion, constipation or diarrhea, burning with urination, abnormal heart rate    When to call 911and go to the nearest emergency room   acute onset of chest pain, shortness of breath, difficulty breathing    Activity   50% weight bearing with walker or crutches for 2 weeks, then as tolerated. Refer to pages 23-33 of your handbook for instructions and pictures   Complete you Home Exercise Program daily as instructed by your therapist. Refer to pages 36-42 of your handbook for instructions and pictures   Get up every one hour and walk (except at night when sleeping)   AVOID sudden and extreme movement of your hip (surgical leg)   Do not drive or operate heavy machinery    Incision Care   The Aquacel (brown, waterproof) surgical dressing is to remain on your hip for 7 days. On the 7th day have someone gently peel the dressing off by carefully lifting the edge and stretching it slightly to break the adhesive seal   You will have staples in your hip incision. They will be removed by the home health agency staff   If your Aquacel dressing comes loose/off before the 7th day, you may replace it with a dry sterile gauze dressing; change it daily. Once your incision is not draining, you may leave it open to air   You may take a shower with the Aquacel dressing in place. Once the Aquacel is removed, you may shower and get your incision wet but do not submerge your incision under water in a bath tub, hot tub or swimming pool for 6 weeks after surgery. Preventing blood clots   Take Coumadin as prescribed by Dr. Josh Robles for one month following surgery   Wear elastic stockings (TEDS) for 4 weeks.   You should remove them for approximately 1 hour daily for showering/sponge bathing    Pain management   Take pain medication as prescribed; decrease the amount you use as your pain lessens   Avoid alcoholic beverages while taking pain medication   Please be aware that many medications contain Tylenol. We do not want you to over medicate so please read the information below as a guide. Do not take more than 4 Grams of Tylenol in a 24 hour period. (There are 1000 milligrams in one Gram)  o Percocet contains 325 mg of Tylenol per tablet (do not take more than 12 tablets in 24 hours)  o Lortab contains 500 mg of Tylenol per tablet (do not take more than 8 tablets in 24 hours)  o Norco contains 325 mg of Tylenol per tablet (do not take more than 12 tablets in 24 hours).  You may place an ice bag on your hip for 15-20 minutes after exercising and as needed throughout the day and night    Diet   Resume usual diet; drink plenty of fluids; eat foods high in fiber   You may want to take a stool softener (such as Senokot-S or Colace) to prevent constipation while you are taking pain medication. If constipation occurs, take a laxative (such as Dulcolax tablets, Milk of Magnesia, or a suppository)    [facility] Protocol (to be followed by [address] [place] [address])  Nursing-see physicians order   Draw a PT/INR per physicians order. Call results to Dr. Igor Herrera at [phone]  [place] Remove staples per physicians order   Complete head to toe assessment, vital signs   Medication reconciliation   Review pain management   Manage chronic medical conditions    Physical Therapy-see physician's order     Weight bearing status:  Precautions at Admission: PWB, Fall (50%)        ?   Mobility Status:  Supine to Sit:  (unable to assess due to low BP)             Gait:                ADL status overall composite:                 Physical Therapy   Assessment and evaluation-bed mobility; functional transfers (bed, chair, bathroom, stairs); ambulation with equipment, car transfers, safety and ability to get out of house in the event of an emergency   50% weight bearing x 2 weeks then weight bearing as tolerated   AVOID sudden and extreme movement of the hip (surgical leg)   Discuss pain management   Review how to do ADLs.   Refer to pages 43-47 of handbook    Home Exercise Program-refer to pages 36-42 of handbook

## 2018-02-23 NOTE — PROGRESS NOTES
Problem: Mobility Impaired (Adult and Pediatric)  Goal: *Acute Goals and Plan of Care (Insert Text)  Physical Therapy Goals  Initiated 2/21/2018    1. Patient will move from supine to sit and sit to supine  in bed with modified independence within 4 days. 2. Patient will perform sit to stand with modified independence within 4 days. 3. Patient will ambulate with modified independence for 150 feet with the least restrictive device within 4 days. 4. Patient will ascend/descend 5 stairs with 1 handrail(s) with modified independence within 4 days. 5. Patient will perform CHAD home exercise program per protocol with modified independence within 4 days. physical Therapy TREATMENT/DISCHARGE  Patient: Radha Goodrich (80 y.o. male)  Date: 2/23/2018  Diagnosis: DJD RIGHT HIP   Primary osteoarthritis of right hip Primary osteoarthritis of right hip  Procedure(s) (LRB):  RIGHT TOTAL HIP ARTHROPLASTY   (Right) 2 Days Post-Op  Precautions: Fall, PWB, Total hip (avoid sudden and extreme movements)  Chart, physical therapy assessment, plan of care and goals were reviewed. ASSESSMENT:  Patient continues to report less pain and improved mobility. He was able to ambulate with RW with supervision only and after being trained on stairs, he was able to demonstrate them with supervision with the cane and railing to assist.  Used the strap to assist with sit to supine bed mobility and he was able to manage that without issues. Reviewed safety considerations for home including activity recommendations and restrictions. Discussed methods for him to partially squat to empty is colostomy to avoid placing his hip in an unstable position but utilizing a wide base of support laterally rather than posteriorly. He is safe in terms of mobility for discharge home and nurse is aware. PLAN:  Patient will be discharged from physical therapy at this time.   Rationale for discharge:  [x]    Goals Achieved  []    [address]  []    Patient not participating in therapy  []    Other:  Discharge Recommendations:  Home Health  Further Equipment Recommendations for Discharge:  none     SUBJECTIVE:   Patient stated I feel ready.     OBJECTIVE DATA SUMMARY:   Critical Behavior:  Neurologic State: Alert, Appropriate for age  Orientation Level: Oriented X4  Cognition: Appropriate decision making, Appropriate for age attention/concentration, Appropriate safety awareness, Follows commands  Safety/Judgement: Good awareness of safety precautions  Functional Mobility Training:  Bed Mobility:     Supine to Sit: Modified independent  Sit to Supine: Modified independent; Adaptive equipment; Additional time (instructed in use of strap to assist)           Transfers:  Sit to Stand: Modified independent; Adaptive equipment; Additional time  Stand to Sit: Modified independent; Adaptive equipment; Additional time                             Balance:  Sitting: Intact; Without support  Standing: Intact; With support  Ambulation/Gait Training:  Distance (ft): 150 Feet (ft)  Assistive Device: Gait belt;Walker, rolling  Ambulation - Level of Assistance: Supervision; Adaptive equipment; Additional time        Gait Abnormalities: Antalgic;Decreased step clearance; Step to gait  Right Side Weight Bearing: Partial (%) (50)  Left Side Weight Bearing: Full  Base of Support: Widened  Stance: Right decreased  Speed/Keyla: Pace decreased (<100 feet/min)  Step Length: Left shortened;Right shortened  Swing Pattern: Right asymmetrical     Interventions: Safety awareness training; Tactile cues; Verbal cues; Visual/Demos           Stairs:  Number of Stairs Trained: 4  Stairs - Level of Assistance: Supervision; Adaptive equipment; Additional time  Rail Use: Right     Therapeutic Exercises:   SUPINE  EXERCISES   Sets   Reps   Active Active Assist   Passive Self ROM   Comments   Ankle Pumps   []                                          []                                          [] []                                             Quad Sets   []                                          []                                          []                                          []                                             Heel Slides   []                                          []                                          []                                          []                                             Hip Abduction   []                                          []                                          []                                          []                                             Glut Sets   []                                          []                                          []                                          []                                                []                                          []                                          []                                          []                                                []                                          []                                          []                                          []                                               STANDING  EXERCISES   Sets   Reps   Active Active Assist   Passive Self ROM   Comments   Heel Raises   []                                          []                                          []                                          []                                             Hip Abduction   []                                          []                                          []                                          []                                                []                                          []                                          []                                          []                                                []                                          [] []                                          []                                               Pain:  Pain Scale 1: Numeric (0 - 10)  Pain Intensity 1: 0              Activity Tolerance:   Good, no complaints of pain with activity today  Please refer to the flowsheet for vital signs taken during this treatment.   After treatment:   []  Patient left in no apparent distress sitting up in chair  [x]  Patient left in no apparent distress in bed  [x]  Call bell left within reach  [x]  Nursing notified  []  Caregiver present  []  Bed alarm activated    COMMUNICATION/COLLABORATION:   The patients plan of care was discussed with: Occupational Therapist, Registered Nurse and     Antonietta Spring, PT   Time Calculation: 30 mins

## 2018-02-23 NOTE — PROGRESS NOTES
Patient being discharged today. CM contacted [facility] liaison, Taylor Herring to inform her of discharge and additional orders in [facility]-- lab draw tomorrow. . Family to transport patient home.

## 2018-02-23 NOTE — PROGRESS NOTES
[facility]  SBAR Bundled Payment Handoff     FROM:         [address]                       TO: [facility]                                                      ([facility] or Facility name)  [address]  [address] [facility] [address]  Dept: [phone]  Loc: [phone]                                      Room#:  212/00                                                      Discharging Nurse:  Aide Hightower  Unit #:[id number]         SITUATION      ASAScore: ASA 2 - Patient with mild systemic disease with no functional limitations    Admitted:  2/21/2018  Hospital Day: 3      Attending Provider:  Denton Patiño MD     Consultations:  None    PCP:  Grady Calhoun MD   [phone]     Admitting Dx:  DJD RIGHT HIP   Primary osteoarthritis of right hip       Principal Problem:    Primary osteoarthritis of right hip (2/16/2018)      2 Days Post-Op of   Procedure(s):  RIGHT TOTAL HIP ARTHROPLASTY     BY: Denton Patiño MD             ON: 2/21/2018                  Code Status: Full Code             Advance Directive? Yes Not W Pt (Send w/patient)     Isolation:  There are currently no Active Isolations       MDRO: No current active infections    BACKGROUND     Allergies:  No Known Allergies    Past Medical History:   Diagnosis Date    Arthritis     Cancer ([facility] [address]     COLO-RECTAL    Chronic pain     Hypertension        Past Surgical History:   Procedure Laterality Date    HX APPENDECTOMY      HX CHOLECYSTECTOMY      HX COLOSTOMY  2013    COLOSTOMY    HX GI  1995    COLON RESECTION    HX TONSILLECTOMY      HX UROLOGICAL  2017    Excell Spatz       Prior to Admission Medications   Prescriptions Last Dose Informant Patient Reported? Taking? CHOLECALCIFEROL, VITAMIN D3, (VITAMIN D3 PO) 2/14/2018 at Unknown time  Yes Yes   Sig: Take 1.25 mg by mouth every seven (7) days.  WEDNESDAY   CYANOCOBALAMIN, VITAMIN B-12, (VITAMIN B-12 INJECTION) 2/16/2018  Yes No   Sig: by Injection route every month. MULTIVITAMIN PO 2/14/2018 at Unknown time  Yes Yes   Sig: Take  by mouth daily. doxycycline (ADOXA) 50 mg tablet 2/20/2018 at 1700  Yes Yes   Sig: Take 50 mg by mouth daily. hydroCHLOROthiazide (HYDRODIURIL) 12.5 mg tablet 2/20/2018 at 0800  Yes Yes   Sig: Take 12.5 mg by mouth daily. naproxen sodium (ALEVE) 220 mg tablet 2/16/2018  Yes No   Sig: Take 220 mg by mouth daily as needed. nystatin (MYCOSTATIN) powder 2/21/2018 at 0430  Yes Yes   Sig: Apply  to affected area as needed. tamsulosin (FLOMAX) 0.4 mg capsule 2/20/2018 at 1700  Yes Yes   Sig: Take 0.4 mg by mouth daily. Facility-Administered Medications: None       Vaccinations: There is no immunization history on file for this patient. ASSESSMENT   Age: 68 y.o. Gender: male        Height: Height: 5' 7\" (170.2 cm)                    Weight:Weight: 67.4 kg (148 lb 9.6 oz)     Patient Vitals for the past 8 hrs:   Temp Pulse Resp BP SpO2   02/23/18 0932 98.1 °F (36.7 °C) 76 16 107/64 100 %            Active Orders   Diet    DIET REGULAR       Orientation: Orientation Level: Oriented X4    Active Lines/Drains:  (Peg Tube / Lam / CL or S/L?):no    Urinary Status: Voiding      Last BM: Last Bowel Movement Date: 02/22/18     Skin Integrity: Incision (comment) (aqucell)   Wound Hip Right-DRESSING STATUS: Clean, dry, and intact    Wound Hip Right-DRESSING TYPE: [employer] products    Mobility: Slightly limited   Weight Bearing Status: WBAT (Weight Bearing as Tolerated)      Gait Training  Assistive Device: Gait belt, Walker, rolling  Ambulation - Level of Assistance: Supervision, Adaptive equipment, Additional time  Distance (ft): 150 Feet (ft)  Stairs - Level of Assistance: Supervision, Adaptive equipment, Additional time  Number of Stairs Trained: 4  Rail Use: Right   Interventions: Safety awareness training, Tactile cues, Verbal cues, Visual/Demos     On Anticoagulation?  YES Coumadin                                      Last dose:  02/21/2018at 1440    Pain Medications given:  Tylenol                                Last dose: 2/23/2018 at  1500    Lab Results   Component Value Date/Time    Glucose 89 02/22/2018 04:03 AM    Hemoglobin A1c 5.3 02/15/2018 12:03 PM    INR 2.2 (H) 02/23/2018 03:07 AM    INR 1.5 (H) 02/22/2018 04:03 AM    HGB 9.2 (L) 02/23/2018 03:07 AM    HGB 9.1 (L) 02/22/2018 04:03 AM    HGB 12.6 02/15/2018 12:03 PM       Readmission Risks:  Score:         RECOMMENDATION     See After Visit Summary (AVS) for:  · Discharge instructions  · After [address]   · Medication Reconciliation          [facility] Orthopaedic Nurse Navigator  OG Bahena, RN-BC       Office  [phone]  Cell      [phone]  Fax      [phone]  [email or web address]             . Janet

## 2018-02-23 NOTE — PROGRESS NOTES
TOTAL HIP PROGRESS NOTE      Subjective:     Post-Operative Day: 2 Status Post right Total Hip Arthroplasty  Systemic or Specific Complaints:No Complaints    Objective:     Patient Vitals for the past 24 hrs:   BP Temp Pulse Resp SpO2   02/23/18 0256 92/54 98.3 °F (36.8 °C) 78 17 98 %   02/22/18 2019 104/61 98.6 °F (37 °C) 72 16 100 %   02/22/18 1414 117/62 98.5 °F (36.9 °C) 76 16 100 %   02/22/18 1411 118/65 - 79 - -   02/22/18 1243 134/72 - 81 - -   02/22/18 1200 118/65 - 79 - -   02/22/18 0936 120/65 - 71 - -   02/22/18 0930 112/63 - 80 - -       General: alert, cooperative, no distress, appears stated age   Wound: Wound clean and dry no evidence of infection. , No Erythema, No Edema, No Drainage and Wound Intact   Motion: Painless Range of Motion   DVT Exam: No evidence of DVT seen on physical exam.  Negative Deyvi's sign. No cords or calf tenderness. No significant calf/ankle edema. Data Review:    Recent Results (from the past 24 hour(s))   HEMOGLOBIN    Collection Time: 02/23/18  3:07 AM   Result Value Ref Range    HGB 9.2 (L) 12.1 - 17.0 g/dL   PROTHROMBIN TIME + INR    Collection Time: 02/23/18  3:07 AM   Result Value Ref Range    INR 2.2 (H) 0.9 - 1.1      Prothrombin time 22.1 (H) 9.0 - 11.1 sec         Assessment:     Status Post right Total Hip Arthroplasty. Doing well postoperatively. Bandage aquacell, clean/dry. Labs stable. PT progressing well. Pain control WNL. Plan:     Continues current post-op course  Continue PT per protocol. Plan to discharge to [facility] today.

## 2018-02-24 ENCOUNTER — HOME CARE VISIT (OUTPATIENT)
Dept: SCHEDULING | Facility: HOME HEALTH | Age: 78
End: 2018-02-24
Payer: MEDICARE

## 2018-02-24 LAB
INR BLD: 1.3 (ref 0.9–1.1)
PT POC: 15.1 SECONDS (ref 11.8–14.9)

## 2018-02-24 PROCEDURE — 3331090002 HH PPS REVENUE DEBIT

## 2018-02-24 PROCEDURE — 3331090001 HH PPS REVENUE CREDIT

## 2018-02-24 PROCEDURE — G0299 HHS/HOSPICE OF RN EA 15 MIN: HCPCS

## 2018-02-24 PROCEDURE — G0151 HHCP-SERV OF PT,EA 15 MIN: HCPCS

## 2018-02-24 PROCEDURE — 400013 HH SOC

## 2018-02-25 VITALS
TEMPERATURE: 98.6 F | DIASTOLIC BLOOD PRESSURE: 68 MMHG | HEART RATE: 84 BPM | SYSTOLIC BLOOD PRESSURE: 130 MMHG | OXYGEN SATURATION: 98 % | RESPIRATION RATE: 18 BRPM

## 2018-02-25 VITALS
SYSTOLIC BLOOD PRESSURE: 122 MMHG | OXYGEN SATURATION: 98 % | HEART RATE: 76 BPM | TEMPERATURE: 98.4 F | DIASTOLIC BLOOD PRESSURE: 64 MMHG

## 2018-02-25 PROCEDURE — 3331090001 HH PPS REVENUE CREDIT

## 2018-02-25 PROCEDURE — 3331090002 HH PPS REVENUE DEBIT

## 2018-02-26 ENCOUNTER — HOME CARE VISIT (OUTPATIENT)
Dept: SCHEDULING | Facility: HOME HEALTH | Age: 78
End: 2018-02-26
Payer: MEDICARE

## 2018-02-26 PROCEDURE — G0300 HHS/HOSPICE OF LPN EA 15 MIN: HCPCS

## 2018-02-26 PROCEDURE — 3331090001 HH PPS REVENUE CREDIT

## 2018-02-26 PROCEDURE — 3331090002 HH PPS REVENUE DEBIT

## 2018-02-26 NOTE — DISCHARGE SUMMARY
Discharge Summary    Physician: Moniac Jade MD    Physician Assistant: Emmanuelle Palmer PA-C    Admit Diagnosis:  DJD RIGHT HIP   Primary osteoarthritis of right hip    Final Diagnosis:   1. Advanced degenerative arthritis of right hip . Procedure: Right total hip replacement    Complications: None. History of Present Illness: The patient has a long-standing history of pain within the right hip . The patient has severe degenerative arthritis of the right hip and has exhausted conservative therapy at this time including prior intra-articular injections, activity modifications, OTC NSAIDS. The patient has been limited in their ability to perform ADLs, walk short distances or climb steps appropriately. The patient presents for the above-mentioned procedure. The patient has been cleared from a medical and cardiac standpoint. Past Medical History:  Recorded in the chart. Physical Examination: Also recorded in the chart. The patient is noted for ambulating with an antalgic gait favoring the right side. Examination of the right hip reveals attempts to rotate the hip are painful. His right leg is about 1cm shorter than contralateral leg. He has an 8 degree flexion contracture on the right. No effusion. No sign of infection. No ecchymosis or erythema. No cellulitis or rash. No calf pain, no evidence of DVT. The neurovascular status is intact . X-rays reveal severe degenerative arthritis of the right hip with complete joint space narrowing and early acetabular protrusio presentation. Course in the Hospital:  The patient was admitted to the hospital.  The Pt. Underwent a right total hip replacement . Postoperatively, the pt. did well. The pt. Remained stable from a medical standpoint. The patient ambulated, 50% WBAT weightbearing within the hospital with Physical Therapy. The patient continued with this therapy at [facility] with PT.   The patient began taking Coumadin pre-operatively for DVT prophylaxis and will continue on this for one month. At the time of discharge, there was no evidence of any DVT noted. The patient had negative Homans signs bilateral lower extremities. At the time of discharge, the patient's right hip incision appeared with staples intact. No signs of infection or inflammation noted. The patient will follow up in our office in 2-1/2 weeks. DC med list:  Discharge Medication List as of 2/23/2018  2:46 PM      START taking these medications    Details   oxyCODONE IR (ROXICODONE) 5 mg immediate release tablet Take 1 Tab by mouth every four (4) hours as needed for Pain. Max Daily Amount: 30 mg., Print, Disp-60 Tab, R-0      celecoxib (CELEBREX) 200 mg capsule Take 1 Cap by mouth daily for 30 days. , Print, Disp-30 Cap, R-0      warfarin (COUMADIN) 2.5 mg tablet Take 1 Tab by mouth daily. , Print, Disp-90 Tab, R-1         CONTINUE these medications which have NOT CHANGED    Details   hydroCHLOROthiazide (HYDRODIURIL) 12.5 mg tablet Take 12.5 mg by mouth daily. , Historical Med      doxycycline (ADOXA) 50 mg tablet Take 50 mg by mouth daily. , Historical Med      tamsulosin (FLOMAX) 0.4 mg capsule Take 0.4 mg by mouth daily. , Historical Med      CHOLECALCIFEROL, VITAMIN D3, (VITAMIN D3 PO) Take 1.25 mg by mouth every seven (7) days. WEDNESDAY, Historical Med      CYANOCOBALAMIN, VITAMIN B-12, (VITAMIN B-12 INJECTION) by Injection route every month., Historical Med      MULTIVITAMIN PO Take  by mouth daily. , Historical Med      nystatin (MYCOSTATIN) powder Apply  to affected area as needed., Historical Med         STOP taking these medications       naproxen sodium (ALEVE) 220 mg tablet Comments:   Reason for Stopping:               Patient Disposition: Home  Followup Care:  Discharge Condition: good  PT/OT per Home Health  Regular Diet  As directed    Follow-up Information     Follow up With Details Comments [address], MD   [address] [place], [place] [address]y [phone] [facility]  home health   call agency if not contacted by noon the day after discharge to inquire as to the day and time of intial visit.  [address] [facility] [address]  [address] PA-C

## 2018-02-27 ENCOUNTER — HOME CARE VISIT (OUTPATIENT)
Dept: SCHEDULING | Facility: HOME HEALTH | Age: 78
End: 2018-02-27
Payer: MEDICARE

## 2018-02-27 VITALS
SYSTOLIC BLOOD PRESSURE: 118 MMHG | RESPIRATION RATE: 16 BRPM | DIASTOLIC BLOOD PRESSURE: 70 MMHG | OXYGEN SATURATION: 97 % | TEMPERATURE: 98 F | HEART RATE: 69 BPM

## 2018-02-27 VITALS
SYSTOLIC BLOOD PRESSURE: 100 MMHG | RESPIRATION RATE: 18 BRPM | DIASTOLIC BLOOD PRESSURE: 60 MMHG | OXYGEN SATURATION: 100 % | HEART RATE: 78 BPM | TEMPERATURE: 98.1 F

## 2018-02-27 PROCEDURE — G0151 HHCP-SERV OF PT,EA 15 MIN: HCPCS

## 2018-02-27 PROCEDURE — 3331090001 HH PPS REVENUE CREDIT

## 2018-02-27 PROCEDURE — 3331090002 HH PPS REVENUE DEBIT

## 2018-02-28 PROCEDURE — 3331090002 HH PPS REVENUE DEBIT

## 2018-02-28 PROCEDURE — 3331090001 HH PPS REVENUE CREDIT

## 2018-03-01 ENCOUNTER — HOME CARE VISIT (OUTPATIENT)
Dept: SCHEDULING | Facility: HOME HEALTH | Age: 78
End: 2018-03-01
Payer: MEDICARE

## 2018-03-01 VITALS
SYSTOLIC BLOOD PRESSURE: 114 MMHG | RESPIRATION RATE: 16 BRPM | TEMPERATURE: 98 F | HEART RATE: 70 BPM | DIASTOLIC BLOOD PRESSURE: 70 MMHG | OXYGEN SATURATION: 94 %

## 2018-03-01 PROCEDURE — A4649 SURGICAL SUPPLIES: HCPCS

## 2018-03-01 PROCEDURE — 3331090002 HH PPS REVENUE DEBIT

## 2018-03-01 PROCEDURE — 3331090001 HH PPS REVENUE CREDIT

## 2018-03-01 PROCEDURE — G0300 HHS/HOSPICE OF LPN EA 15 MIN: HCPCS

## 2018-03-01 PROCEDURE — G0151 HHCP-SERV OF PT,EA 15 MIN: HCPCS

## 2018-03-02 PROCEDURE — 3331090001 HH PPS REVENUE CREDIT

## 2018-03-02 PROCEDURE — 3331090002 HH PPS REVENUE DEBIT

## 2018-03-03 PROCEDURE — 3331090001 HH PPS REVENUE CREDIT

## 2018-03-03 PROCEDURE — 3331090002 HH PPS REVENUE DEBIT

## 2018-03-04 PROCEDURE — 3331090001 HH PPS REVENUE CREDIT

## 2018-03-04 PROCEDURE — 3331090002 HH PPS REVENUE DEBIT

## 2018-03-05 ENCOUNTER — HOME CARE VISIT (OUTPATIENT)
Dept: SCHEDULING | Facility: HOME HEALTH | Age: 78
End: 2018-03-05
Payer: MEDICARE

## 2018-03-05 VITALS
OXYGEN SATURATION: 100 % | HEART RATE: 78 BPM | SYSTOLIC BLOOD PRESSURE: 112 MMHG | DIASTOLIC BLOOD PRESSURE: 64 MMHG | TEMPERATURE: 98.1 F | RESPIRATION RATE: 18 BRPM

## 2018-03-05 PROCEDURE — 3331090001 HH PPS REVENUE CREDIT

## 2018-03-05 PROCEDURE — G0300 HHS/HOSPICE OF LPN EA 15 MIN: HCPCS

## 2018-03-05 PROCEDURE — 3331090002 HH PPS REVENUE DEBIT

## 2018-03-06 VITALS
RESPIRATION RATE: 18 BRPM | SYSTOLIC BLOOD PRESSURE: 124 MMHG | OXYGEN SATURATION: 96 % | TEMPERATURE: 98.2 F | DIASTOLIC BLOOD PRESSURE: 60 MMHG | HEART RATE: 78 BPM

## 2018-03-06 PROCEDURE — 3331090001 HH PPS REVENUE CREDIT

## 2018-03-06 PROCEDURE — 3331090002 HH PPS REVENUE DEBIT

## 2018-03-07 ENCOUNTER — HOME CARE VISIT (OUTPATIENT)
Dept: SCHEDULING | Facility: HOME HEALTH | Age: 78
End: 2018-03-07
Payer: MEDICARE

## 2018-03-07 VITALS
TEMPERATURE: 97.8 F | HEART RATE: 75 BPM | RESPIRATION RATE: 16 BRPM | OXYGEN SATURATION: 95 % | SYSTOLIC BLOOD PRESSURE: 118 MMHG | DIASTOLIC BLOOD PRESSURE: 70 MMHG

## 2018-03-07 PROCEDURE — 3331090001 HH PPS REVENUE CREDIT

## 2018-03-07 PROCEDURE — 3331090002 HH PPS REVENUE DEBIT

## 2018-03-07 PROCEDURE — G0151 HHCP-SERV OF PT,EA 15 MIN: HCPCS

## 2018-03-08 ENCOUNTER — HOME CARE VISIT (OUTPATIENT)
Dept: SCHEDULING | Facility: HOME HEALTH | Age: 78
End: 2018-03-08
Payer: MEDICARE

## 2018-03-08 VITALS
SYSTOLIC BLOOD PRESSURE: 130 MMHG | DIASTOLIC BLOOD PRESSURE: 80 MMHG | TEMPERATURE: 98 F | HEART RATE: 65 BPM | OXYGEN SATURATION: 95 %

## 2018-03-08 PROCEDURE — 3331090001 HH PPS REVENUE CREDIT

## 2018-03-08 PROCEDURE — G0299 HHS/HOSPICE OF RN EA 15 MIN: HCPCS

## 2018-03-08 PROCEDURE — 3331090002 HH PPS REVENUE DEBIT

## 2018-03-13 ENCOUNTER — HOSPITAL ENCOUNTER (OUTPATIENT)
Dept: VASCULAR SURGERY | Age: 78
Discharge: HOME | End: 2018-03-13
Attending: ORTHOPAEDIC SURGERY
Payer: MEDICARE

## 2018-03-13 DIAGNOSIS — M79.604 RIGHT LEG PAIN: ICD-10-CM

## 2018-03-13 PROCEDURE — 93971 EXTREMITY STUDY: CPT

## 2018-03-13 NOTE — PROCEDURES
[facility]  *** FINAL REPORT ***    Name: German Perales  MRN: [id number]    Outpatient  : 26 Dec 1940  HIS Order #: [id number]  [address] [facility] Visit #: [id number]  Date: 13 Mar 2018    TYPE OF TEST: Peripheral Venous Testing    REASON FOR TEST  Pain in limb    Right Leg:-  Deep venous thrombosis:           No  Superficial venous thrombosis:    No  Deep venous insufficiency:        Not examined  Superficial venous insufficiency: Not examined      INTERPRETATION/FINDINGS  PROCEDURE:  Color duplex ultrasound imaging of lower extremity veins. FINDINGS:       Right: The common femoral, deep femoral, femoral, popliteal,  posterior tibial, peroneal, and great saphenous are patent and without   evidence of thrombus; each is is fully compressible and there is no  narrowing of the flow channel on color Doppler imaging. Phasic flow  is observed in the common femoral vein. Left:   The common femoral vein is patent and without evidence of   thrombus. Phasic flow is observed. This extremity was not otherwise   evaluated. IMPRESSION:  No evidence of right lower extremity vein thrombosis. ADDITIONAL COMMENTS    I have personally reviewed the data relevant to the interpretation of  this  study. TECHNOLOGIST: Alana Cummings.  Hossein Guaman  Signed: 2018 02:59 PM    PHYSICIAN: Mary Hernández MD  Signed: 2018 07:33 AM

## 2021-03-10 ENCOUNTER — DOCUMENTATION ONLY (OUTPATIENT)
Dept: SURGERY | Age: 81
End: 2021-03-10

## 2021-03-10 ENCOUNTER — OFFICE VISIT (OUTPATIENT)
Dept: SURGERY | Age: 81
End: 2021-03-10
Payer: MEDICARE

## 2021-03-10 VITALS
DIASTOLIC BLOOD PRESSURE: 80 MMHG | BODY MASS INDEX: 23.23 KG/M2 | WEIGHT: 148 LBS | TEMPERATURE: 98.3 F | HEIGHT: 67 IN | HEART RATE: 85 BPM | SYSTOLIC BLOOD PRESSURE: 146 MMHG

## 2021-03-10 DIAGNOSIS — R92.8 ABNORMAL ULTRASOUND OF BREAST: ICD-10-CM

## 2021-03-10 DIAGNOSIS — N63.10 BREAST MASS, RIGHT: Primary | ICD-10-CM

## 2021-03-10 PROCEDURE — 19083 BX BREAST 1ST LESION US IMAG: CPT | Performed by: SURGERY

## 2021-03-10 PROCEDURE — 99203 OFFICE O/P NEW LOW 30 MIN: CPT | Performed by: SURGERY

## 2021-03-10 NOTE — LETTER
3/15/2021 1:40 PM 
 
Patient:  Juan Tijerina YOB: 1940 Date of Visit: 3/10/2021 Dear Dr. Alvin Min: 
 
 
Thank you for referring Mr. Werner Becerra to me for evaluation/treatment. Below are the relevant portions of my assessment and plan of care. If you have questions, please do not hesitate to call me. I look forward to following Mr. Jazzy Ochoa along with you.  
 
 
 
Sincerely, 
 
 
Yin Clark MD

## 2021-03-10 NOTE — PATIENT INSTRUCTIONS
Learning About Physical Activity  What is physical activity? Physical activity is any kind of activity that gets your body moving. The types of physical activity that can help you get fit and stay healthy include:  · Aerobic or \"cardio\" activities that make your heart beat faster and make you breathe harder, such as brisk walking, riding a bike, or running. Aerobic activities strengthen your heart and lungs and build up your endurance. · Strength training activities that make your muscles work against, or \"resist,\" something, such as lifting weights or doing push-ups. These activities help tone and strengthen your muscles. · Stretches that allow you to move your joints and muscles through their full range of motion. Stretching helps you be more flexible and avoid injury. What are the benefits of physical activity? Being active is one of the best things you can do for your health. It helps you to:  · Feel stronger and have more energy to do all the things you like to do. · Focus better at school or work. · Feel, think, and sleep better. · Reach and stay at a healthy weight. · Lose fat and build lean muscle. · Lower your risk for serious health problems. · Keep your bones, muscles, and joints strong. How can you make physical activity part of your life? Get at least 30 minutes of exercise on most days of the week. Walking is a good choice. You also may want to do other activities, such as running, swimming, cycling, or playing tennis or team sports. Pick activities that you like--ones that make your heart beat faster, your muscles stronger, and your muscles and joints more flexible. If you find more than one thing you like doing, do them all. You don't have to do the same thing every day. Get your heart pumping every day. Any activity that makes your heart beat faster and keeps it at that rate for a while counts.   Here are some great ways to get your heart beating faster:  · Go for a brisk walk, run, or bike ride. · Go for a hike or swim. · Go in-line skating. · Play a game of touch football, basketball, or soccer. · Ride a bike. · Play tennis or racquetball. · Climb stairs. Even some household chores can be aerobic--just do them at a faster pace. Vacuuming, raking or mowing the lawn, sweeping the garage, and washing and waxing the car all can help get your heart rate up. Strengthen your muscles during the week. You don't have to lift heavy weights or grow big, bulky muscles to get stronger. Doing a few simple activities that make your muscles work against, or \"resist,\" something can help you get stronger. For example, you can:  · Do push-ups or sit-ups, which use your own body weight as resistance. · Lift weights or dumbbells or use stretch bands at home or in a gym or community center. Stretch your muscles often. Stretching will help you as you become more active. It can help you stay flexible, loosen tight muscles, and avoid injury. It can also help improve your balance and posture and can be a great way to relax. Be sure to stretch the muscles you'll be using when you work out. It's best to warm your muscles slightly before you stretch them. Walk or do some other light aerobic activity for a few minutes, and then start stretching. When you stretch your muscles:  · Do it slowly. Stretching is not about going fast or making sudden movements. · Don't push or bounce during a stretch. · Hold each stretch for at least 15 to 30 seconds, if you can. You should feel a stretch in the muscle, but not pain. · Breathe out as you do the stretch. Then breathe in as you hold the stretch. Don't hold your breath. If you're worried about how more activity might affect your health, have a checkup before you start. Follow any special advice your doctor gives you for getting a smart start. Where can you learn more?   Go to http://www.gray.com/  Enter [id number] in the search box to learn more about \"Learning About Physical Activity. \"  Current as of: January 16, 2020               Content Version: 12.6  © 5341-1621 [employer]. Care instructions adapted under license by [employer] (which disclaims liability or warranty for this information). If you have questions about a medical condition or this instruction, always ask your healthcare professional. [address] any warranty or liability for your use of this information.

## 2021-03-10 NOTE — PROGRESS NOTES
[facility]  OFFICE PROCEDURE PROGRESS NOTE        Chart reviewed for the following:   I, Dr. Asael Gordillo, have reviewed the History, Physical and updated the Allergic reactions for Kendrick Benites performed immediately prior to start of procedure:   I, Dr. Asael Gordillo, have performed the following reviews on Gladys Bustillo prior to the start of the procedure:            * Patient was identified by name and date of birth   * Agreement on procedure being performed was verified  * Risks and Benefits explained to the patient  * Procedure site verified and marked as necessary  * Patient was positioned for comfort  * Consent was signed and verified     Time: 1100am      Date of procedure: 3/10/2021    Procedure performed by:  Dr. Asael Gordillo    Provider assisted by: Lorenzo Frederick RN    Patient assisted by: Lorenzo Frederick RN    How tolerated by patient: Patient tolerated the procedure well without complications. Denies pain post biopsy. Post Procedural Pain Scale: 0 none    Comments: Written and verbal post biopsy instructions reviewed with and given to patient with her understanding.

## 2021-03-10 NOTE — PROGRESS NOTES
HISTORY OF PRESENT ILLNESS  Constanza Perales is a [de-identified] y.o. male. HPI  NEW patient referral for consultation by Dr. Eloy Kendall for a RIGHT breast infection and mass. The patient states he started with symptoms 10 days ago. The symptoms included RIGHT breast swelling, tenderness and redness. Two days later her saw his PCP who started him on antibiotics (amoxicillin). Currently the antibiotics have helped with the symptoms, the swelling and inflammation has decreased. However, pt states that he has noticed a mass \"for quite some time. \"    Family history - Sister had breast cancer at age 27 and survived. No imaging.       Past Medical History:   Diagnosis Date    Arthritis     Cancer ([facility] [address]     COLO-RECTAL    Chronic pain     Hypertension        Past Surgical History:   Procedure Laterality Date    HX APPENDECTOMY      HX CHOLECYSTECTOMY      HX COLOSTOMY  2013    COLOSTOMY    HX GI  1995    COLON RESECTION    HX TONSILLECTOMY      HX UROLOGICAL  2017    VAPORZATION       Social History     Socioeconomic History    Marital status:      Spouse name: Not on file    Number of children: Not on file    Years of education: Not on file    Highest education level: Not on file   Occupational History    Not on file   Social Needs    Financial resource strain: Not on file    Food insecurity     Worry: Not on file     Inability: Not on file    Transportation needs     Medical: Not on file     Non-medical: Not on file   Tobacco Use    Smoking status: Former Smoker    Smokeless tobacco: Never Used   Substance and Sexual Activity    Alcohol use: Yes     Comment: DAILY WINE    Drug use: No    Sexual activity: Not on file   Lifestyle    Physical activity     Days per week: Not on file     Minutes per session: Not on file    Stress: Not on file   Relationships    Social connections     Talks on phone: Not on file     Gets together: Not on file     Attends Episcopal service: Not on file     Active member of club or organization: Not on file     Attends meetings of clubs or organizations: Not on file     Relationship status: Not on file    Intimate partner violence     Fear of current or ex partner: Not on file     Emotionally abused: Not on file     Physically abused: Not on file     Forced sexual activity: Not on file   Other Topics Concern    Not on file   Social History Narrative    Not on file       Current Outpatient Medications on File Prior to Visit   Medication Sig Dispense Refill    hydroCHLOROthiazide (HYDRODIURIL) 12.5 mg tablet Take 12.5 mg by mouth daily.  doxycycline (ADOXA) 50 mg tablet Take 50 mg by mouth daily.  tamsulosin (FLOMAX) 0.4 mg capsule Take 0.4 mg by mouth daily.  CHOLECALCIFEROL, VITAMIN D3, (VITAMIN D3 PO) Take 1.25 mg by mouth every seven (7) days. WEDNESDAY      CYANOCOBALAMIN, VITAMIN B-12, (VITAMIN B-12 INJECTION) by Injection route every month.  MULTIVITAMIN PO Take  by mouth daily.  [DISCONTINUED] oxyCODONE IR (ROXICODONE) 5 mg immediate release tablet Take 1 Tab by mouth every four (4) hours as needed for Pain. Max Daily Amount: 30 mg. 60 Tab 0    [DISCONTINUED] warfarin (COUMADIN) 2.5 mg tablet Take 1 Tab by mouth daily. (Patient taking differently: Take 1 Tab by mouth daily. alternate 2 tabs with 1 tab) 90 Tab 1     No current facility-administered medications on file prior to visit. No Known Allergies      ROS  Constitutional: Negative. HENT: Negative. Eyes: Negative. Respiratory: Negative. Cardiovascular: Negative. Gastrointestinal: Negative. Genitourinary: Negative. Musculoskeletal: Positive for joint pain and myalgias. Skin: Negative. Neurological: Negative. Endo/Heme/Allergies: Negative. Psychiatric/Behavioral: Negative. Physical Exam  Exam conducted with a chaperone present. Cardiovascular:      Rate and Rhythm: Normal rate and regular rhythm. Heart sounds: Normal heart sounds.    Pulmonary: Breath sounds: Normal breath sounds. Chest:      Breasts: Breasts are symmetrical.         Right: Mass and skin change (erythema) present. No swelling, bleeding, inverted nipple, nipple discharge or tenderness. Left: Normal. No swelling, bleeding, inverted nipple, mass, nipple discharge, skin change or tenderness. Lymphadenopathy:      Cervical:      Right cervical: No superficial, deep or posterior cervical adenopathy. Left cervical: No superficial, deep or posterior cervical adenopathy. Upper Body:      Right upper body: No supraclavicular or axillary adenopathy. Left upper body: No supraclavicular or axillary adenopathy. BREAST ULTRASOUND  Indication: RIGHT breast mass retroareolar  Technique: The area was scanned using a high-frequency linear-array near-field transducer  Findings: Mass, and 1 architecturally normal axillary LN  Impression: Suspicious mass  Disposition: Ultrasound-guided biopsy performed      US-GUIDED CORE BIOPSY  Following detailed explanation and description of the biopsy procedure, its risk, benefits and possible alternatives, the patient signed the informed consent. Indication: Mass, Ultrasound Visible, RIGHT breast retroareolar   Prep: We cleansed the skin with alcohol. Anesthesia: We anesthetized the skin and underlying tissues with 1% lidocaine with epinephrine. Device: We advanced the BARD Marquee device through the lesion and captured tissue with real-time ultrasound confirmation. Core Sampling: We repeated this sampling for the following number of cores, 3. Marker: We placed a marking clip to sowmya the biopsy site. Marker Type: HydroMARK. Dressing: We then closed the incision with steristrips and placed a sterile dressing. Instructions: The patient was instructed regarding post-procedure care and activities. Pathology: Pending at this time. Patient tolerated procedure well and discharged in stable condition.     Informed patient that they will be notified of pathology results in 3 to 5 days. ASSESSMENT and PLAN    ICD-10-CM ICD-9-CM    1. Breast mass, right  N63.10 611.72    2. Abnormal ultrasound of breast  R92.8 793.89       New patient presents for evaluation of RIGHT breast infection and mass, and is doing well overall. Large palpable mass on exam at RIGHT breast retroareolar, with some erythema. US visualizes mass, and 1 architecturally normal axillary LN. Discussed bx of breast mass and pt agreed to proceed. He tolerated the procedure well, and 3 cores were taken, [facility] marker placed. Will send bx specimens for PATH and f/u with results. This plan was reviewed with the patient and patient agrees. All questions were answered. Total time spent was 40 minutes.     Written by Violetta Davis, as dictated by Dr. Bhavya Chandra MD.

## 2021-03-10 NOTE — PROGRESS NOTES
HISTORY OF PRESENT ILLNESS Garima Huynh is a [de-identified] y.o. male. HPI NEW patient referral for consultation by Dr. Kate Jones for a RIGHT breast infection. The patient states he started with symptoms 10 days ago. The symptoms included RIGHT breast swelling, tenderness and redness. Two days later her saw his PCP who started him on antibiotics. ( amoxicillin) Currently the antibiotics have helped with the symptoms, the swelling and inflammation has decreased. Family history - Sister had breast cancer at age 27 and survived. No imaging Review of Systems Constitutional: Negative. HENT: Negative. Eyes: Negative. Respiratory: Negative. Cardiovascular: Negative. Gastrointestinal: Negative. Genitourinary: Negative. Musculoskeletal: Positive for joint pain and myalgias. Skin: Negative. Neurological: Negative. Endo/Heme/Allergies: Negative. Psychiatric/Behavioral: Negative. Physical Exam 
 
ASSESSMENT and PLAN 
{ASSESSMENT/PLAN:[id number]}

## 2021-03-11 ENCOUNTER — HOSPITAL ENCOUNTER (OUTPATIENT)
Dept: LAB | Age: 81
Discharge: HOME | End: 2021-03-11

## 2021-03-17 ENCOUNTER — TELEPHONE (OUTPATIENT)
Dept: SURGERY | Age: 81
End: 2021-03-17

## 2021-04-07 NOTE — PROGRESS NOTES
HISTORY OF PRESENT ILLNESS Bhavna Casper is a [de-identified] y.o. male. HPI 
*** Past Medical History:  
Diagnosis Date  Arthritis  Cancer ([address] COLO-RECTAL  Chronic pain  Hypertension Past Surgical History:  
Procedure Laterality Date  HX APPENDECTOMY  HX CHOLECYSTECTOMY  HX COLOSTOMY  2013 [address]Y [address] COLON RESECTION  
 HX TONSILLECTOMY  HX UROLOGICAL  2017 [place] Social History Socioeconomic History  Marital status:  Spouse name: Not on file  Number of children: Not on file  Years of education: Not on file  Highest education level: Not on file Occupational History  Not on file Social Needs  Financial resource strain: Not on file  Food insecurity Worry: Not on file Inability: Not on file  Transportation needs Medical: Not on file Non-medical: Not on file Tobacco Use  Smoking status: Former Smoker  Smokeless tobacco: Never Used Substance and Sexual Activity  Alcohol use: Yes Comment: DAILY WINE  
 Drug use: No  
 Sexual activity: Not on file Lifestyle  Physical activity Days per week: Not on file Minutes per session: Not on file  Stress: Not on file Relationships  Social connections Talks on phone: Not on file Gets together: Not on file Attends Buddhist service: Not on file Active member of club or organization: Not on file Attends meetings of clubs or organizations: Not on file Relationship status: Not on file  Intimate partner violence Fear of current or ex partner: Not on file Emotionally abused: Not on file Physically abused: Not on file Forced sexual activity: Not on file Other Topics Concern  Not on file Social History Narrative  Not on file Current Outpatient Medications on File Prior to Visit Medication Sig Dispense Refill  hydroCHLOROthiazide (HYDRODIURIL) 12.5 mg tablet Take 12.5 mg by mouth daily.  doxycycline (ADOXA) 50 mg tablet Take 50 mg by mouth daily.  tamsulosin (FLOMAX) 0.4 mg capsule Take 0.4 mg by mouth daily.  CHOLECALCIFEROL, VITAMIN D3, (VITAMIN D3 PO) Take 1.25 mg by mouth every seven (7) days. WEDNESDAY    
 CYANOCOBALAMIN, VITAMIN B-12, (VITAMIN B-12 INJECTION) by Injection route every month.  MULTIVITAMIN PO Take  by mouth daily. No current facility-administered medications on file prior to visit. No Known Allergies ROS Physical Exam 
Exam conducted with a chaperone present. Cardiovascular:  
   Rate and Rhythm: Normal rate and regular rhythm. Heart sounds: Normal heart sounds. Pulmonary:  
   Breath sounds: Normal breath sounds. Chest:  
   Breasts: Breasts are symmetrical.  
      Right: Mass and skin change (erythema) present. No swelling, bleeding, inverted nipple, nipple discharge or tenderness. Left: Normal. No swelling, bleeding, inverted nipple, mass, nipple discharge, skin change or tenderness. Lymphadenopathy:  
   Cervical:  
   Right cervical: No superficial, deep or posterior cervical adenopathy. Left cervical: No superficial, deep or posterior cervical adenopathy. Upper Body:  
   Right upper body: No supraclavicular or axillary adenopathy. Left upper body: No supraclavicular or axillary adenopathy. ASSESSMENT and PLAN 
{No Diagnosis Found} Patient presents to follow up on RIGHT breast mass***, and is doing well overall. F/U ***. This plan was reviewed with the patient and patient agrees. All questions were answered. Total time spent was 20 minutes.  
 
Written by Grzegorz Freeman, as dictated by Dr. Yaakov Stephens MD. 
 
 
 
***UPDATE PHYSICAL EXAM***

## 2021-04-09 ENCOUNTER — OFFICE VISIT (OUTPATIENT)
Dept: SURGERY | Age: 81
End: 2021-04-09
Payer: MEDICARE

## 2021-04-09 VITALS
SYSTOLIC BLOOD PRESSURE: 135 MMHG | DIASTOLIC BLOOD PRESSURE: 77 MMHG | BODY MASS INDEX: 23.23 KG/M2 | WEIGHT: 148 LBS | HEIGHT: 67 IN | HEART RATE: 71 BPM | TEMPERATURE: 98.2 F

## 2021-04-09 DIAGNOSIS — N63.10 BREAST MASS, RIGHT: Primary | ICD-10-CM

## 2021-04-09 PROCEDURE — 99213 OFFICE O/P EST LOW 20 MIN: CPT | Performed by: SURGERY

## 2021-04-09 PROCEDURE — 76642 ULTRASOUND BREAST LIMITED: CPT | Performed by: SURGERY

## 2021-04-09 NOTE — PROGRESS NOTES
HISTORY OF PRESENT ILLNESS Rhona Washubrn is a [de-identified] y.o. male. HPI   ESTABLISHED patient here for recheck of RIGHT breast.  He says that this has almost returned to normal.  Is not having any pain. Feeling well today. ROS Physical Exam 
 
ASSESSMENT and PLAN 
{ASSESSMENT/PLAN:[id number]}

## 2021-04-09 NOTE — PROGRESS NOTES
HISTORY OF PRESENT ILLNESS  Segun Carlisle is a [de-identified] y.o. male. HPI  ESTABLISHED patient here for recheck of RIGHT breast mass. He says that this has almost returned to normal. Is not having any pain and states tenderness and swelling has resolved. Feeling well today. He notes that the mass has been present for several years.      Past Medical History:   Diagnosis Date    Arthritis     Cancer ([address]     COLO-RECTAL    Chronic pain     Hypertension        Past Surgical History:   Procedure Laterality Date    HX APPENDECTOMY      HX CHOLECYSTECTOMY      HX COLOSTOMY  2013    COLOSTOMY    HX GI  1995    COLON RESECTION    HX TONSILLECTOMY      HX UROLOGICAL  2017    VAPORZATION       Social History     Socioeconomic History    Marital status:      Spouse name: Not on file    Number of children: Not on file    Years of education: Not on file    Highest education level: Not on file   Occupational History    Not on file   Social Needs    Financial resource strain: Not on file    Food insecurity     Worry: Not on file     Inability: Not on file    Transportation needs     Medical: Not on file     Non-medical: Not on file   Tobacco Use    Smoking status: Former Smoker    Smokeless tobacco: Never Used   Substance and Sexual Activity    Alcohol use: Yes     Comment: DAILY WINE    Drug use: No    Sexual activity: Not on file   Lifestyle    Physical activity     Days per week: Not on file     Minutes per session: Not on file    Stress: Not on file   Relationships    Social connections     Talks on phone: Not on file     Gets together: Not on file     Attends [employer] service: Not on file     Active member of club or organization: Not on file     Attends meetings of clubs or organizations: Not on file     Relationship status: Not on file    Intimate partner violence     Fear of current or ex partner: Not on file     Emotionally abused: Not on file     Physically abused: Not on file     Forced sexual activity: Not on file   Other Topics Concern    Not on file   Social History Narrative    Not on file       Current Outpatient Medications on File Prior to Visit   Medication Sig Dispense Refill    hydroCHLOROthiazide (HYDRODIURIL) 12.5 mg tablet Take 12.5 mg by mouth daily.  doxycycline (ADOXA) 50 mg tablet Take 50 mg by mouth daily.  tamsulosin (FLOMAX) 0.4 mg capsule Take 0.4 mg by mouth daily.  CHOLECALCIFEROL, VITAMIN D3, (VITAMIN D3 PO) Take 1.25 mg by mouth every seven (7) days. WEDNESDAY      CYANOCOBALAMIN, VITAMIN B-12, (VITAMIN B-12 INJECTION) by Injection route every month.  MULTIVITAMIN PO Take  by mouth daily. No current facility-administered medications on file prior to visit. No Known Allergies    ROS    Physical Exam  Exam conducted with a chaperone present. Cardiovascular:      Rate and Rhythm: Normal rate and regular rhythm. Heart sounds: Normal heart sounds. Pulmonary:      Breath sounds: Normal breath sounds. Chest:      Breasts: Breasts are symmetrical.         Right: Mass present. No swelling, bleeding, inverted nipple, nipple discharge, skin change or tenderness. Left: Normal. No swelling, bleeding, inverted nipple, mass, nipple discharge, skin change or tenderness. Lymphadenopathy:      Cervical:      Right cervical: No superficial, deep or posterior cervical adenopathy. Left cervical: No superficial, deep or posterior cervical adenopathy. Upper Body:      Right upper body: No supraclavicular or axillary adenopathy. Left upper body: No supraclavicular or axillary adenopathy. BREAST ULTRASOUND  Indication: RIGHT breast mass retroareolar  Technique: The area was scanned using a high-frequency linear-array near-field transducer  Findings: Biopsy clip well placed in center of mass.  No shadowing noted; no cysts; no axillary lymphadenopathy  Impression: Right breast mass  Disposition: No worrisome finding on ultrasound     ASSESSMENT and PLAN    ICD-10-CM ICD-9-CM    1. Breast mass, right  N63.10 611.72       Patient presents to follow up on RIGHT breast mass, and is doing well overall. On exam, patient has resolved erythema and tenderness. The RIGHT breast mass is still present, but unchanged. RIGHT breast US visualizes biopsy clip well placed in center of mass and no axillary adenopathy. We will continue to monitor the mass as it has been present for many years without significant change. F/U in 6 months. This plan was reviewed with the patient and patient agrees. All questions were answered. Total time spent was 20 minutes.     Written by Franky Mix, as dictated by Dr. Jan Trejo MD.

## 2021-10-08 ENCOUNTER — OFFICE VISIT (OUTPATIENT)
Dept: SURGERY | Age: 81
End: 2021-10-08
Payer: MEDICARE

## 2021-10-08 VITALS — BODY MASS INDEX: 23.23 KG/M2 | HEIGHT: 67 IN | WEIGHT: 148 LBS

## 2021-10-08 DIAGNOSIS — N63.10 BREAST MASS, RIGHT: Primary | ICD-10-CM

## 2021-10-08 PROCEDURE — G8432 DEP SCR NOT DOC, RNG: HCPCS | Performed by: SURGERY

## 2021-10-08 PROCEDURE — 99213 OFFICE O/P EST LOW 20 MIN: CPT | Performed by: SURGERY

## 2021-10-08 PROCEDURE — G8536 NO DOC ELDER MAL SCRN: HCPCS | Performed by: SURGERY

## 2021-10-08 PROCEDURE — G8420 CALC BMI NORM PARAMETERS: HCPCS | Performed by: SURGERY

## 2021-10-08 PROCEDURE — 76642 ULTRASOUND BREAST LIMITED: CPT | Performed by: SURGERY

## 2021-10-08 PROCEDURE — 1101F PT FALLS ASSESS-DOCD LE1/YR: CPT | Performed by: SURGERY

## 2021-10-08 PROCEDURE — G8427 DOCREV CUR MEDS BY ELIG CLIN: HCPCS | Performed by: SURGERY

## 2021-10-08 NOTE — PROGRESS NOTES
HISTORY OF PRESENT ILLNESS  Daryle Round is a [de-identified] y.o. male. HPI  ESTABLISHED Patient here for follow up RIGHT breast mass. No changes since last visit and denies pain tot he RIGHT breast area.     03/10/21: Right breast mass, core biopsy. PATH: Inspissated and necrotic material/debris with reactive fibrosis, macrophages and calcifications. Comment: The etiology of this process is uncertain. It may be related to an infected/ruptured cystic lesion. Clinical and radiographic correlation is recommended with followup, as clinically indicated. Past Medical History:   Diagnosis Date    Arthritis     Cancer ([address]     COLO-RECTAL    Chronic pain     Hypertension        Past Surgical History:   Procedure Laterality Date    HX APPENDECTOMY      HX CHOLECYSTECTOMY      HX COLOSTOMY  2013    COLOSTOMY    HX GI  1995    COLON RESECTION    HX TONSILLECTOMY      HX UROLOGICAL  2017    VAPORZATION       Social History     Socioeconomic History    Marital status:      Spouse name: Not on file    Number of children: Not on file    Years of education: Not on file    Highest education level: Not on file   Occupational History    Not on file   Tobacco Use    Smoking status: Former Smoker    Smokeless tobacco: Never Used   Substance and Sexual Activity    Alcohol use: Yes     Comment: DAILY WINE    Drug use: No    Sexual activity: Not on file   Other Topics Concern    Not on file   Social History Narrative    Not on file     Social Determinants of Health     Financial Resource Strain:     Difficulty of Paying Living Expenses:    Food Insecurity:     Worried About Running Out of Food in the Last Year:     Ran Out of Food in the Last Year:    Transportation Needs:     Lack of Transportation (Medical):      Lack of Transportation (Non-Medical):    Physical Activity:     Days of Exercise per Week:     Minutes of Exercise per Session:    Stress:     Feeling of Stress :    Social Connections:     Frequency of Communication with Friends and Family:     Frequency of Social Gatherings with Friends and Family:     Attends Hindu Services:     Active Member of Clubs or Organizations:     Attends Club or Organization Meetings:     Marital Status:    Intimate Partner Violence:     Fear of Current or Ex-Partner:     Emotionally Abused:     Physically Abused:     Sexually Abused:        Current Outpatient Medications on File Prior to Visit   Medication Sig Dispense Refill    hydroCHLOROthiazide (HYDRODIURIL) 12.5 mg tablet Take 12.5 mg by mouth daily.  tamsulosin (FLOMAX) 0.4 mg capsule Take 0.4 mg by mouth daily.  CHOLECALCIFEROL, VITAMIN D3, (VITAMIN D3 PO) Take 1.25 mg by mouth every seven (7) days. WEDNESDAY      CYANOCOBALAMIN, VITAMIN B-12, (VITAMIN B-12 INJECTION) by Injection route every month.  MULTIVITAMIN PO Take  by mouth daily.  doxycycline (ADOXA) 50 mg tablet Take 50 mg by mouth daily. (Patient not taking: Reported on 10/8/2021)       No current facility-administered medications on file prior to visit. No Known Allergies        ROS    Physical Exam  Exam conducted with a chaperone present. Cardiovascular:      Rate and Rhythm: Normal rate and regular rhythm. Heart sounds: Normal heart sounds. Pulmonary:      Breath sounds: Normal breath sounds. Chest:      Breasts: Breasts are symmetrical.         Right: Mass present. No swelling, bleeding, inverted nipple, nipple discharge, skin change or tenderness. Left: Normal. No swelling, bleeding, inverted nipple, mass, nipple discharge, skin change or tenderness. Lymphadenopathy:      Cervical:      Right cervical: No superficial, deep or posterior cervical adenopathy. Left cervical: No superficial, deep or posterior cervical adenopathy. Upper Body:      Right upper body: No supraclavicular or axillary adenopathy. Left upper body: No supraclavicular or axillary adenopathy. BREAST ULTRASOUND  Indication: RIGHT breast mass retroareolar  Technique: The area was scanned using a high-frequency linear-array near-field transducer  Findings:  3.0 x 1.8 x 2.18cm mass, homogeneous and smooth, with well placed bx clip  Impression: Benign mass per bx  Disposition: Re-measure in 6 months      ASSESSMENT and PLAN    ICD-10-CM ICD-9-CM    1. Breast mass, right  N63.10 611.72       Patient presents to follow up on RIGHT breast mass, and is doing well overall. Palpable mass on exam at RIGHT breast retroareolar. US visualizes 3.0 x 1.8 x 2.18cm mass, homogeneous and smooth, with well placed bx clip. Will re-measure again in 6 months to make sure mass is not enlarging. F/U in 6 months. This plan was reviewed with the patient and patient agrees. All questions were answered. Total time spent was 20 minutes.     Written by Rosalinda Dahl, as dictated by Dr. Bhavya Chandra MD.

## 2021-10-08 NOTE — PROGRESS NOTES
HISTORY OF PRESENT ILLNESS  Christian Acosta is a [de-identified] y.o. male. HPI ESTABLISHED Patient here for follow up RIGHT breast mass. No changes since last visit and denies pain tot he RIGHT breast area.     03/10/21:  Right breast mass, core biopsy. PATH: Inspissated and necrotic material/debris with reactive fibrosis, macrophages and calcifications.        ROS    Physical Exam    ASSESSMENT and PLAN  {ASSESSMENT/PLAN:[id number]}

## 2021-10-27 ENCOUNTER — TELEPHONE (OUTPATIENT)
Dept: SURGERY | Age: 81
End: 2021-10-27

## 2021-10-27 NOTE — TELEPHONE ENCOUNTER
Called regarding abnormal PET scan and breast mass. Currently scheduled for more bxs with colorectal surgery.  Will touch base with us after to consider excision of breast mass or re biopsy

## 2021-11-01 ENCOUNTER — TRANSCRIBE ORDER (OUTPATIENT)
Dept: REGISTRATION | Age: 81
End: 2021-11-01

## 2021-11-01 ENCOUNTER — HOSPITAL ENCOUNTER (OUTPATIENT)
Dept: LAB | Age: 81
Discharge: HOME | End: 2021-11-01
Payer: MEDICARE

## 2021-11-01 DIAGNOSIS — Z01.812 ENCOUNTER FOR PREOPERATIVE SCREENING LABORATORY TESTING FOR COVID-19 VIRUS: ICD-10-CM

## 2021-11-01 DIAGNOSIS — Z20.822 ENCOUNTER FOR PREOPERATIVE SCREENING LABORATORY TESTING FOR COVID-19 VIRUS: Primary | ICD-10-CM

## 2021-11-01 DIAGNOSIS — Z01.812 ENCOUNTER FOR PREOPERATIVE SCREENING LABORATORY TESTING FOR COVID-19 VIRUS: Primary | ICD-10-CM

## 2021-11-01 DIAGNOSIS — Z20.822 ENCOUNTER FOR PREOPERATIVE SCREENING LABORATORY TESTING FOR COVID-19 VIRUS: ICD-10-CM

## 2021-11-01 PROCEDURE — U0005 INFEC AGEN DETEC AMPLI PROBE: HCPCS

## 2021-11-02 LAB
SARS-COV-2, XPLCVT: NOT DETECTED
SOURCE, COVRS: NORMAL

## 2021-11-04 ENCOUNTER — ANESTHESIA EVENT (OUTPATIENT)
Dept: ENDOSCOPY | Age: 81
End: 2021-11-04
Payer: MEDICARE

## 2021-11-04 ENCOUNTER — ANESTHESIA (OUTPATIENT)
Dept: ENDOSCOPY | Age: 81
End: 2021-11-04
Payer: MEDICARE

## 2021-11-04 ENCOUNTER — HOSPITAL ENCOUNTER (OUTPATIENT)
Age: 81
Setting detail: OUTPATIENT SURGERY
Discharge: HOME | End: 2021-11-04
Attending: INTERNAL MEDICINE | Admitting: INTERNAL MEDICINE
Payer: MEDICARE

## 2021-11-04 VITALS
DIASTOLIC BLOOD PRESSURE: 68 MMHG | RESPIRATION RATE: 18 BRPM | HEIGHT: 67 IN | SYSTOLIC BLOOD PRESSURE: 136 MMHG | HEART RATE: 81 BPM | BODY MASS INDEX: 23.23 KG/M2 | OXYGEN SATURATION: 100 % | WEIGHT: 148 LBS | TEMPERATURE: 98 F

## 2021-11-04 PROCEDURE — 76040000019: Performed by: INTERNAL MEDICINE

## 2021-11-04 PROCEDURE — 2709999900 HC NON-CHARGEABLE SUPPLY: Performed by: INTERNAL MEDICINE

## 2021-11-04 PROCEDURE — 77030003406 HC NDL ASPIR BIOP OCOA -C: Performed by: INTERNAL MEDICINE

## 2021-11-04 PROCEDURE — 77030003474 HC NDL BIOP COOK -D: Performed by: INTERNAL MEDICINE

## 2021-11-04 PROCEDURE — 76060000031 HC ANESTHESIA FIRST 0.5 HR: Performed by: INTERNAL MEDICINE

## 2021-11-04 PROCEDURE — 88342 IMHCHEM/IMCYTCHM 1ST ANTB: CPT

## 2021-11-04 PROCEDURE — 88341 IMHCHEM/IMCYTCHM EA ADD ANTB: CPT

## 2021-11-04 PROCEDURE — 74011000250 HC RX REV CODE- 250: Performed by: NURSE ANESTHETIST, CERTIFIED REGISTERED

## 2021-11-04 PROCEDURE — 74011250636 HC RX REV CODE- 250/636: Performed by: NURSE ANESTHETIST, CERTIFIED REGISTERED

## 2021-11-04 PROCEDURE — 88305 TISSUE EXAM BY PATHOLOGIST: CPT

## 2021-11-04 PROCEDURE — 77030021593 HC FCPS BIOP ENDOSC BSC -A: Performed by: INTERNAL MEDICINE

## 2021-11-04 PROCEDURE — 77030016053: Performed by: INTERNAL MEDICINE

## 2021-11-04 PROCEDURE — 88360 TUMOR IMMUNOHISTOCHEM/MANUAL: CPT

## 2021-11-04 RX ORDER — ATROPINE SULFATE 0.1 MG/ML
0.4 INJECTION INTRAVENOUS
Status: DISCONTINUED | OUTPATIENT
Start: 2021-11-04 | End: 2021-11-04 | Stop reason: HOSPADM

## 2021-11-04 RX ORDER — PROPOFOL 10 MG/ML
INJECTION, EMULSION INTRAVENOUS
Status: DISCONTINUED | OUTPATIENT
Start: 2021-11-04 | End: 2021-11-04 | Stop reason: HOSPADM

## 2021-11-04 RX ORDER — SODIUM CHLORIDE 9 MG/ML
50 INJECTION, SOLUTION INTRAVENOUS CONTINUOUS
Status: DISCONTINUED | OUTPATIENT
Start: 2021-11-04 | End: 2021-11-04 | Stop reason: HOSPADM

## 2021-11-04 RX ORDER — FLUMAZENIL 0.1 MG/ML
0.2 INJECTION INTRAVENOUS
Status: DISCONTINUED | OUTPATIENT
Start: 2021-11-04 | End: 2021-11-04 | Stop reason: HOSPADM

## 2021-11-04 RX ORDER — DEXTROMETHORPHAN/PSEUDOEPHED 2.5-7.5/.8
1.2 DROPS ORAL
Status: DISCONTINUED | OUTPATIENT
Start: 2021-11-04 | End: 2021-11-04 | Stop reason: HOSPADM

## 2021-11-04 RX ORDER — NYSTATIN 100000 [USP'U]/G
POWDER TOPICAL AS NEEDED
COMMUNITY

## 2021-11-04 RX ORDER — MIDAZOLAM HYDROCHLORIDE 1 MG/ML
.25-5 INJECTION, SOLUTION INTRAMUSCULAR; INTRAVENOUS
Status: DISCONTINUED | OUTPATIENT
Start: 2021-11-04 | End: 2021-11-04 | Stop reason: HOSPADM

## 2021-11-04 RX ORDER — LIDOCAINE HYDROCHLORIDE 20 MG/ML
INJECTION, SOLUTION EPIDURAL; INFILTRATION; INTRACAUDAL; PERINEURAL AS NEEDED
Status: DISCONTINUED | OUTPATIENT
Start: 2021-11-04 | End: 2021-11-04 | Stop reason: HOSPADM

## 2021-11-04 RX ORDER — NALOXONE HYDROCHLORIDE 0.4 MG/ML
0.4 INJECTION, SOLUTION INTRAMUSCULAR; INTRAVENOUS; SUBCUTANEOUS
Status: DISCONTINUED | OUTPATIENT
Start: 2021-11-04 | End: 2021-11-04 | Stop reason: HOSPADM

## 2021-11-04 RX ORDER — HYDROCORTISONE ACETATE 25 MG/1
25 SUPPOSITORY RECTAL EVERY 12 HOURS
Qty: 20 SUPPOSITORY | Refills: 0 | Status: SHIPPED | OUTPATIENT
Start: 2021-11-04 | End: 2021-11-14

## 2021-11-04 RX ORDER — SODIUM CHLORIDE 9 MG/ML
INJECTION, SOLUTION INTRAVENOUS
Status: DISCONTINUED | OUTPATIENT
Start: 2021-11-04 | End: 2021-11-04 | Stop reason: HOSPADM

## 2021-11-04 RX ORDER — PROPOFOL 10 MG/ML
INJECTION, EMULSION INTRAVENOUS AS NEEDED
Status: DISCONTINUED | OUTPATIENT
Start: 2021-11-04 | End: 2021-11-04 | Stop reason: HOSPADM

## 2021-11-04 RX ORDER — EPINEPHRINE 0.1 MG/ML
1 INJECTION INTRACARDIAC; INTRAVENOUS
Status: DISCONTINUED | OUTPATIENT
Start: 2021-11-04 | End: 2021-11-04 | Stop reason: HOSPADM

## 2021-11-04 RX ADMIN — PROPOFOL INJECTABLE EMULSION 100 MG: 10 INJECTION, EMULSION INTRAVENOUS at 13:59

## 2021-11-04 RX ADMIN — LIDOCAINE HYDROCHLORIDE 100 MG: 20 INJECTION, SOLUTION INTRAVENOUS at 13:59

## 2021-11-04 RX ADMIN — PROPOFOL 100 MCG/KG/MIN: 10 INJECTION, EMULSION INTRAVENOUS at 13:59

## 2021-11-04 RX ADMIN — SODIUM CHLORIDE: 9 INJECTION, SOLUTION INTRAVENOUS at 13:46

## 2021-11-04 NOTE — DISCHARGE INSTRUCTIONS
[address]  [facility], [address]    Endoscopic ultrasound DISCHARGE INSTRUCTIONS    2021      Margarita Hartmann  :  1940  [place] Medical Record Number:  [id number]    DISCOMFORT    redness at IV site- apply warm compress to area; if redness or soreness persist- contact your physician  Gaseous discomfort- walking, belching will help relieve any discomfort  You may not operate a vehicle for 12 hours  You may not engage in an occupation involving machinery or appliances for rest of today  You may not drink alcoholic beverages for at least 12 hours  Avoid making any critical decisions for at least 24 hour  DIET  You may eat and drink now and after you leave. You may resume your regular diet - however -  remember your colon is empty and a heavy meal will produce gas. Avoid these foods:  vegetables, fried / greasy foods, carbonated drinks    ACTIVITY  You may resume your normal daily activities   Spend the remainder of the day resting -  avoid any strenuous activity. CALL M.D. ANY SIGN OF   Increasing pain, nausea, vomiting  Abdominal distension (swelling)  New increased bleeding (oral or rectal)  Fever (chills)  Pain in chest area  Bloody discharge from nose or mouth  Shortness of breath    FOLLOW UP INSTRUCTIONS   Call Dr. Meghana Catherine for any questions or problems. Telephone # [phone]    ENDOSCOPY FINDINGS   Your endoscopy showed severe inflammation in the rectum, biopsies were obtained, ultrasound showed thickened wall consistent with the inflammation. Findings were relayed to Dr. Yadi Bruce, we will let you know once biopsy results are back. Please follow-up with Dr. Yadi Bruce.     Signed By: Alondra Mehta MD   2021  2:35 PM

## 2021-11-04 NOTE — ANESTHESIA PREPROCEDURE EVALUATION
Anesthetic History   No history of anesthetic complications            Review of Systems / Medical History  Patient summary reviewed and pertinent labs reviewed    Pulmonary  Within defined limits                 Neuro/Psych   Within defined limits           Cardiovascular  Within defined limits                Exercise tolerance: >4 METS     GI/Hepatic/Renal               Comments: Rectal bleeding Endo/Other        Arthritis     Other Findings   Comments: H/o colorectal ca         Physical Exam    Airway  Mallampati: I  TM Distance: > 6 cm  Neck ROM: decreased range of motion   Mouth opening: Normal     Cardiovascular  Regular rate and rhythm,  S1 and S2 normal,  no murmur, click, rub, or gallop  Rhythm: regular  Rate: normal         Dental  No notable dental hx       Pulmonary  Breath sounds clear to auscultation               Abdominal  GI exam deferred       Other Findings            Anesthetic Plan    ASA: 2  Anesthesia type: MAC          Induction: Intravenous  Anesthetic plan and risks discussed with: Patient

## 2021-11-04 NOTE — H&P
Betina Wahl M.D.  [phone]            History and Physical       NAME:  Margarita Hartmann   :   1940   MRN:   [id number]       Referring Physician:  Dr. Alexandro Araujo Date: 2021 2:22 PM    Chief Complaint:  Rectal discharge and abnormal CT scan    History of Present Illness:  Patient is a [de-identified] y.o. who is seen for worsening rectal discharge and abnormal CT scan. PMH:  Past Medical History:   Diagnosis Date    Arthritis     Cancer ([address]     COLO-RECTAL    Chronic pain     Hypertension        PSH:  Past Surgical History:   Procedure Laterality Date    HX APPENDECTOMY      HX CHOLECYSTECTOMY      HX COLOSTOMY  2013    COLOSTOMY    HX GI  1995    COLON RESECTION    HX TONSILLECTOMY      HX UROLOGICAL  2017    VAPORZATION       Allergies:  No Known Allergies    Home Medications:  Prior to Admission Medications   Prescriptions Last Dose Informant Patient Reported? Taking? CHOLECALCIFEROL, VITAMIN D3, (VITAMIN D3 PO) 11/3/2021  Yes No   Sig: Take 1.25 mg by mouth every seven (7) days. WEDNESDAY   CYANOCOBALAMIN, VITAMIN B-12, (VITAMIN B-12 INJECTION) 10/21/2021  Yes No   Sig: by Injection route every month. MULTIVITAMIN PO Not Taking at Unknown time  Yes No   Sig: Take  by mouth daily. Patient not taking: Reported on 2021   doxycycline (ADOXA) 50 mg tablet 11/3/2021 at Unknown time  Yes Yes   Sig: Take 50 mg by mouth daily. hydroCHLOROthiazide (HYDRODIURIL) 12.5 mg tablet 2021 at Unknown time  Yes Yes   Sig: Take 12.5 mg by mouth daily. nystatin (MYCOSTATIN) powder 2021 at Unknown time  Yes Yes   Sig: Apply  to affected area four (4) times daily. tamsulosin (FLOMAX) 0.4 mg capsule 11/3/2021 at Unknown time  Yes Yes   Sig: Take 0.4 mg by mouth daily.       Facility-Administered Medications: None       Hospital Medications:  Current Facility-Administered Medications   Medication Dose Route Frequency    0.9% sodium chloride infusion 50 mL/hr IntraVENous CONTINUOUS    midazolam (VERSED) injection 0.25-5 mg  0.25-5 mg IntraVENous Multiple    naloxone (NARCAN) injection 0.4 mg  0.4 mg IntraVENous Multiple    flumazeniL (ROMAZICON) 0.1 mg/mL injection 0.2 mg  0.2 mg IntraVENous Multiple    simethicone (MYLICON) 92VR/8.6AL oral drops 80 mg  1.2 mL Oral Multiple    atropine injection 0.4 mg  0.4 mg IntraVENous ONCE PRN    EPINEPHrine (ADRENALIN) 0.1 mg/mL syringe 1 mg  1 mg Endoscopically ONCE PRN     Facility-Administered Medications Ordered in Other Encounters   Medication Dose Route Frequency    0.9% sodium chloride infusion   IntraVENous CONTINUOUS    propofoL (DIPRIVAN) 10 mg/mL injection   IntraVENous PRN    propofoL (DIPRIVAN) 10 mg/mL injection   IntraVENous CONTINUOUS    lidocaine (PF) (XYLOCAINE) 20 mg/mL (2 %) injection   IntraVENous PRN       Social History:  Social History     Tobacco Use    Smoking status: Former Smoker    Smokeless tobacco: Never Used   Substance Use Topics    Alcohol use: Yes     Comment: DAILY WINE       Family History:  Family History   Problem Relation Age of Onset    Cancer Mother         BRAIN    Heart Disease Paternal Uncle     Heart Disease Paternal Grandfather     Anesth Problems Neg Hx              Review of Systems:      Constitutional: negative fever, negative chills, negative weight loss  Eyes:   negative visual changes  ENT:   negative sore throat, tongue or lip swelling  Respiratory:  negative cough, negative dyspnea  Cards:  negative for chest pain, palpitations, lower extremity edema  GI:   See HPI  :  negative for frequency, dysuria  Integument:  negative for rash and pruritus  Heme:  negative for easy bruising and gum/nose bleeding  Musculoskel: negative for myalgias,  back pain and muscle weakness  Neuro: negative for headaches, dizziness, vertigo  Psych:  negative for feelings of anxiety, depression       Objective:     Patient Vitals for the past 8 hrs:   BP Temp Pulse Resp SpO2 Height Weight   11/04/21 1304 136/68 97.5 °F (36.4 °C) 81 18 100 % 5' 7\" (1.702 m) 67.1 kg (148 lb)     No intake/output data recorded. No intake/output data recorded. EXAM:     NEURO-a&o   HEENT-wnl   LUNGS-clear    COR-regular rate and rhythym     ABD-soft , no tenderness, no rebound, good bs     EXT-no edema     Data Review     No results for input(s): WBC, HGB, HCT, PLT, HGBEXT, HCTEXT, PLTEXT in the last 72 hours. No results for input(s): NA, K, CL, CO2, BUN, CREA, GLU, PHOS, CA in the last 72 hours. No results for input(s): AP, TBIL, TP, ALB, GLOB, GGT, AML, LPSE in the last 72 hours. No lab exists for component: SGOT, GPT, AMYP, HLPSE  No results for input(s): INR, PTP, APTT, INREXT in the last 72 hours. Patient Active Problem List   Diagnosis Code    Primary osteoarthritis of right hip M16.11      Assessment:   · Rectal discharge and abnormal CT scan   Plan:   · Endorectal EUS today.      Signed By: Dixie Rivera MD     11/4/2021  2:22 PM

## 2021-11-04 NOTE — PROCEDURES
Serenity Taylor M.D.  [phone]       2021                Endorectal EUS Operative Report  Gladys Bustillo  :  1940  [facility] Medical Record Number:  [id number]      Procedure Type: Proctoscopy and endorectal EUS      Indications: Rectal discharge and abnormal CT scan    Pre-operative Diagnosis: see indication above    Post-operative Diagnosis:  See findings below    : Geraldene Merlin, MD    Referring Provider: -Danial Melendez MD -Candelario Mattson MD    Procedure Details:    Exam:  Airway: clear, no airway problems anticipated  Heart: RRR, without gallops or rubs  Lungs: clear bilaterally without wheezes, crackles, or rhonchi  Abdomen: soft, nontender, nondistended, bowel sounds present  Mental Status: awake, alert and oriented to person, place and time     Anethesia/Sedation:  MAC anesthesia      Procedure Details   After infom consent was obtained for the procedure, with all risks and benefits of procedure explained the patient was taken to the endoscopy suite and placed in the left lateral decubitus position. Following sequential administration of sedation as per above, the colonoscope, then radial echoendoscope were sequentially inserted into the rectum. Findings:     Endoscopic:   Evidence of diffuse hyperemic mucosa with areas of dusky mucosa covered by thick whitish exudates. No mass lesion seen, however thickened folds were noted in the rectum. Very friable and ulcerated mucosa that will bleed to touch. No deep ulcers seen. Ultrasound: Thickened rectum wall was noted throughout the inflamed area. No anechoic areas suggestive of abscesses were seen. No lymphadenopathy noted. Specimen Removed:  Rectum    Complications: None. EBL:  None. Interventions: none      Impression:     Proctitis    Recommendations:      Follow-up on biopsy results  Follow-up with Dr. Ebony Mott MD

## 2021-11-04 NOTE — PROGRESS NOTES
Endoscopy discharge instructions have been reviewed and given to patient. The patient verbalized understanding and acceptance of instructions. Dr. Scottie Matias discussed with patient procedure findings and next steps.

## 2021-11-04 NOTE — PROGRESS NOTES
[facility]  1940  [id number]    Situation:  Verbal report received from: Shelley Brown RN   Procedure: Procedure(s):  LINEAR AND RADIAL  ENDOSCOPIC ULTRASOUND (EUS)  SIGMOIDOSCOPY FLEXIBLE  COLON BIOPSY    Background:    Preoperative diagnosis: RECTAL MASS  Postoperative diagnosis: 1.- Proctitis    :  Dr. Ashlee Lim  Assistant(s): Endoscopy RN-1: Sadi Wheat RN  Endoscopy RN-2: Nenita Dodge RN    Specimens:   ID Type Source Tests Collected by Time Destination   1 : Distal rectum Preservative Rectum  Linda Beaulieu MD 11/4/2021 1403 Pathology     H. Pylori  no    Assessment:    Anesthesia gave intra-procedure sedation and medications, see anesthesia flow sheet no    Intravenous fluids: NS@ KVO     Vital signs stable     Abdominal assessment: round and soft     Recommendation:  Discharge patient per MD order.   Return to floor  Family or Friend   Permission to share finding with family or friend yes

## 2021-11-06 NOTE — ANESTHESIA POSTPROCEDURE EVALUATION
Procedure(s):  LINEAR AND RADIAL  ENDOSCOPIC ULTRASOUND (EUS)  SIGMOIDOSCOPY FLEXIBLE  COLON BIOPSY. MAC    Anesthesia Post Evaluation      Multimodal analgesia: multimodal analgesia not used between 6 hours prior to anesthesia start to PACU discharge  Patient location during evaluation: PACU  Patient participation: complete - patient participated  Level of consciousness: awake  Pain management: adequate  Airway patency: patent  Anesthetic complications: no  Cardiovascular status: acceptable, blood pressure returned to baseline and hemodynamically stable  Respiratory status: acceptable  Hydration status: acceptable  Post anesthesia nausea and vomiting:  controlled  Final Post Anesthesia Temperature Assessment:  Normothermia (36.0-37.5 degrees C)      INITIAL Post-op Vital signs:   Vitals Value Taken Time   /59 11/04/21 1449   Temp 36.7 °C (98 °F) 11/04/21 1430   Pulse 65 11/04/21 1452   Resp 12 11/04/21 1452   SpO2 100 % 11/04/21 1452   Vitals shown include unvalidated device data.

## 2021-12-01 DIAGNOSIS — N63.10 BREAST MASS, RIGHT: Primary | ICD-10-CM

## 2021-12-02 ENCOUNTER — TRANSCRIBE ORDER (OUTPATIENT)
Dept: REGISTRATION | Age: 81
End: 2021-12-02

## 2021-12-02 ENCOUNTER — TELEPHONE (OUTPATIENT)
Dept: SURGERY | Age: 81
End: 2021-12-02

## 2021-12-02 DIAGNOSIS — Z01.812 PRE-PROCEDURE LAB EXAM: Primary | ICD-10-CM

## 2021-12-06 ENCOUNTER — HOSPITAL ENCOUNTER (OUTPATIENT)
Dept: PREADMISSION TESTING | Age: 81
Discharge: HOME | End: 2021-12-06
Attending: SURGERY
Payer: MEDICARE

## 2021-12-06 DIAGNOSIS — Z01.812 PRE-PROCEDURE LAB EXAM: ICD-10-CM

## 2021-12-06 PROCEDURE — U0005 INFEC AGEN DETEC AMPLI PROBE: HCPCS

## 2021-12-07 ENCOUNTER — HOSPITAL ENCOUNTER (OUTPATIENT)
Dept: PREADMISSION TESTING | Age: 81
Discharge: HOME | End: 2021-12-07
Payer: MEDICARE

## 2021-12-07 VITALS
SYSTOLIC BLOOD PRESSURE: 146 MMHG | TEMPERATURE: 97.9 F | HEIGHT: 67 IN | WEIGHT: 146.16 LBS | DIASTOLIC BLOOD PRESSURE: 75 MMHG | HEART RATE: 75 BPM | BODY MASS INDEX: 22.94 KG/M2

## 2021-12-07 DIAGNOSIS — N63.10 BREAST MASS, RIGHT: ICD-10-CM

## 2021-12-07 LAB
ANION GAP SERPL CALC-SCNC: 7 MMOL/L (ref 5–15)
ATRIAL RATE: 71 BPM
BASOPHILS # BLD: 0 K/UL (ref 0–0.1)
BASOPHILS NFR BLD: 0 % (ref 0–1)
BUN SERPL-MCNC: 19 MG/DL (ref 6–20)
BUN/CREAT SERPL: 18 (ref 12–20)
CALCIUM SERPL-MCNC: 8.8 MG/DL (ref 8.5–10.1)
CALCULATED P AXIS, ECG09: 24 DEGREES
CALCULATED R AXIS, ECG10: 64 DEGREES
CALCULATED T AXIS, ECG11: 56 DEGREES
CHLORIDE SERPL-SCNC: 103 MMOL/L (ref 97–108)
CO2 SERPL-SCNC: 28 MMOL/L (ref 21–32)
CREAT SERPL-MCNC: 1.07 MG/DL (ref 0.7–1.3)
DIAGNOSIS, 93000: NORMAL
DIFFERENTIAL METHOD BLD: ABNORMAL
EOSINOPHIL # BLD: 0.2 K/UL (ref 0–0.4)
EOSINOPHIL NFR BLD: 3 % (ref 0–7)
ERYTHROCYTE [DISTWIDTH] IN BLOOD BY AUTOMATED COUNT: 14.4 % (ref 11.5–14.5)
GLUCOSE SERPL-MCNC: 104 MG/DL (ref 65–100)
HCT VFR BLD AUTO: 32.3 % (ref 36.6–50.3)
HGB BLD-MCNC: 10.4 G/DL (ref 12.1–17)
IMM GRANULOCYTES # BLD AUTO: 0 K/UL (ref 0–0.04)
IMM GRANULOCYTES NFR BLD AUTO: 0 % (ref 0–0.5)
LYMPHOCYTES # BLD: 0.9 K/UL (ref 0.8–3.5)
LYMPHOCYTES NFR BLD: 15 % (ref 12–49)
MCH RBC QN AUTO: 29.7 PG (ref 26–34)
MCHC RBC AUTO-ENTMCNC: 32.2 G/DL (ref 30–36.5)
MCV RBC AUTO: 92.3 FL (ref 80–99)
MONOCYTES # BLD: 0.7 K/UL (ref 0–1)
MONOCYTES NFR BLD: 12 % (ref 5–13)
NEUTS SEG # BLD: 4.1 K/UL (ref 1.8–8)
NEUTS SEG NFR BLD: 70 % (ref 32–75)
NRBC # BLD: 0 K/UL (ref 0–0.01)
NRBC BLD-RTO: 0 PER 100 WBC
P-R INTERVAL, ECG05: 170 MS
PLATELET # BLD AUTO: 331 K/UL (ref 150–400)
PMV BLD AUTO: 10.1 FL (ref 8.9–12.9)
POTASSIUM SERPL-SCNC: 4 MMOL/L (ref 3.5–5.1)
Q-T INTERVAL, ECG07: 448 MS
QRS DURATION, ECG06: 80 MS
QTC CALCULATION (BEZET), ECG08: 486 MS
RBC # BLD AUTO: 3.5 M/UL (ref 4.1–5.7)
SODIUM SERPL-SCNC: 138 MMOL/L (ref 136–145)
VENTRICULAR RATE, ECG03: 71 BPM
WBC # BLD AUTO: 5.9 K/UL (ref 4.1–11.1)

## 2021-12-07 PROCEDURE — 36415 COLL VENOUS BLD VENIPUNCTURE: CPT

## 2021-12-07 PROCEDURE — 93005 ELECTROCARDIOGRAM TRACING: CPT

## 2021-12-07 PROCEDURE — 85025 COMPLETE CBC W/AUTO DIFF WBC: CPT

## 2021-12-07 PROCEDURE — 80048 BASIC METABOLIC PNL TOTAL CA: CPT

## 2021-12-07 NOTE — PERIOP NOTES
COVID-19 VACCINE COPY ON CHART. PT GIVEN CLEAR LIQUID DIET INSTRUCTIONS. Pt given 2 bottles of CHG soap and instructed in use. Patient given surgical site infection FAQs handout and hand hygiene tips sheet. Pre-operative instructions reviewed and patient verbalizes understanding of instructions. Patient has been given the opportunity to ask additional questions. PT GIVEN COVID-19 TESTING INSTRUCTIONS.

## 2021-12-07 NOTE — PERIOP NOTES
[facility]  PREOPERATIVE INSTRUCTIONS    Surgery Date:   12/9/2021    Surgery arrival time given by surgeon: YES     If no, [facility] staff will call you between 4 PM- 8 PM the day before surgery with your arrival time. If your surgery is on a Monday, we will call you the preceding Friday. Please call [phone] after 8 PM if you did not receive your arrival time. 1. Please report to [address] Patient Access/Admitting on the 1st floor. Bring your insurance card, photo identification, and any copayment ( if applicable). 2. If you are going home the same day of your surgery, you must have a responsible adult to drive you home. You need to have a responsible adult to stay with you the first 24 hours after surgery and you should not drive a car for 24 hours following your surgery. 3. Do NOT eat any solid foods after midnight the night before surgery including candy, mint or gum. You may drink clear liquids from midnight until 1 hour prior to your arrival. You may drink up to 12 ounces at one time every 4 hours. Please note special instructions, if applicable, below for medications. 4. Do NOT drink alcohol or smoke 24 hours before surgery. STOP smoking for 14 days prior as it helps with breathing and healing after surgery. 5. If you are being admitted to the hospital, please leave personal belongings/luggage in your car until you have an assigned hospital room number. 6. Please wear comfortable clothes. Wear your glasses instead of contacts. We ask that all money, jewelry and valuables be left at home. Wear no make up, particularly mascara, the day of surgery. 7.  All body piercings, rings, and jewelry need to be removed and left at home. Please wear your hair loose or down. Please no pony-tails, buns, or any metal hair accessories. If you shower the morning of surgery, please do not apply any lotions or powders afterwards. You may wear deodorant, unless having breast surgery.   Do not shave any body area within 24 hours of your surgery. 8. Please follow all instructions to avoid any potential surgical cancellation. 9. Should your physical condition change, (i.e. fever, cold, flu, etc.) please notify your surgeon as soon as possible. 10. It is important to be on time. If a situation occurs where you may be delayed, please call:  [phone] / [phone] on the day of surgery. 11. The Preadmission Testing staff can be reached at [phone]. 12. Special instructions: BRING A CASE FOR HEARING AID & CASE FOR GLASSES. Current Outpatient Medications   Medication Sig    nystatin (MYCOSTATIN) powder Apply  to affected area as needed.  hydroCHLOROthiazide (HYDRODIURIL) 12.5 mg tablet Take 12.5 mg by mouth every morning.  doxycycline (ADOXA) 50 mg tablet Take 50 mg by mouth nightly.  tamsulosin (FLOMAX) 0.4 mg capsule Take 0.8 mg by mouth daily (after dinner).  CHOLECALCIFEROL, VITAMIN D3, (VITAMIN D3 PO) Take 1.25 mg by mouth every seven (7) days. WEDNESDAY    CYANOCOBALAMIN, VITAMIN B-12, (VITAMIN B-12 INJECTION) by Injection route every three (3) months. EVERY 3 MONTHS (NEXT DUE JAN. 2022)     No current facility-administered medications for this encounter. 1. MEDICATIONS TO TAKE THE MORNING OF SURGERY WITH A SIP OF WATER: N/A  2. MEDICATIONS TO TAKE THE MORNING OF SURGERY ONLY IF NEEDED: N/A  3. HOLD these medications BEFORE Surgery: HCTZ  4. Ask your surgeon/prescribing physician about when/if to STOP taking these medications: N/A  5. Stop all herbal medicines, fish oil, glucosamine chondroitin, Vit E, multivitamin and Aspirin containing products 7 days prior to surgery. Stop any non-steroidal anti-inflammatory drugs (i.e. Ibuprofen, Naproxen, Advil, Aleve) 3 days before surgery. You may take Tylenol. 6.  If you are currently taking Plavix, Coumadin,or any other blood-thinning/anticoagulant medication contact your prescribing physician for instructions.     Eating and Drinking Before Surgery     You may eat a regular dinner at the usual time on the day before your surgery.  Do NOT eat any solid foods after midnight unless your arrival time at the hospital is 3pm or later.  You may drink clear liquids only from 12 midnight until 1 hours prior to your arrival time at the hospital on the day of your surgery. Do NOT drink alcohol.  Clear liquids include:  o Water  o Fruit juices without pulp( i.e. apple juice)  o Carbonated beverages  o Black coffee (no cream/milk)  o Tea (no cream/milk)  o Gatorade   You may drink up to 12-16 ounces at one time every 4 hours between the hours of midnight and 1 hour before your arrival time at the hospital. Example- if your arrival time at the hospital is 6am, you may drink 12-16 ounces of clear liquids no later than 5am.   If your arrival time at the hospital is 3pm or later, you may eat a light breakfast before 8am.   A light breakfast includes:  o Toast or bagel (no butter)  o Black coffee (no cream/milk)  o Tea (no cream/milk)  o Fruit juices without pulp ( i.e. apple juice)  o Do NOT eat meat, eggs, vegetables or fruit   If you have any questions, please contact your surgeon's office. Preventing Infections Before and After  Your Surgery    IMPORTANT INSTRUCTIONS    Please read and follow these instructions carefully. If you are unable to comply with the below instructions your procedure will be cancelled. You play an important role in your health and preparation for surgery. To reduce the germs on your skin you will need to shower with CHG soap (Chorhexidine gluconate 4%) two times before surgery. CHG soap (Hibiclens, Hex-A-Clens or store brand)   CHG soap will be provided at your Preadmission Testing (PAT) appointment.  If you do not have a PAT appointment before surgery, you may arrange to  CHG soap from our office or purchase CHG soap at a pharmacy, grocery or department store.    You need to purchase TWO 4 ounce bottles to use for your 2 showers. Steps to follow:  1. Wash your hair with your normal shampoo and your body with regular soap and rinse well to remove shampoo and soap from your skin. 2. Wet a clean washcloth and turn off the shower. 3. Put CHG soap on washcloth and apply to your entire body from the neck down. Do not use on your head, face or private parts(genitals). Do not use CHG soap on open sores, wounds or areas of skin irritation. 4. Wash you body gently for 5 minutes. Do not wash your skin too hard. This soap does not create lather. Pay special attention to your underarms and from your belly button to your feet. 5. Turn the shower back on and rinse well to get CHG soap off your body. 6. Pat your skin dry with a clean, dry towel. Do not apply lotions or moisturizer. 7. Put on clean clothes and sleep on fresh bed sheets and do not allow pets to sleep with you. Shower with CHG soap 2 times before your surgery   The evening before your surgery   The morning of your surgery      Tips to help prevent infections after your surgery:  1. Protect your surgical wound from germs:  ? Hand washing is the most important thing you and your caregivers can do to prevent infections. ? Keep your bandage clean and dry! ? Do not touch your surgical wound. 2. Use clean, freshly washed towels and washcloths every time you shower; do not share bath linens with others. 3. Until your surgical wound is healed, wear clothing and sleep on bed linens each day that are clean and freshly washed. 4. Do not allow pets to sleep in your bed with you or touch your surgical wound. 5. Do not smoke  smoking delays wound healing. This may be a good time to stop smoking. 6. If you have diabetes, it is important for you to manage your blood sugar levels properly before your surgery as well as after your surgery. Poorly managed blood sugar levels slow down wound healing and prevent you from healing completely.         . [facility]   Instructions for Pre-Surgery COVID-19 Testing     Across our ministry we have established standard guidelines to ensure the health and safety of our patients, residents and associates as we resume elective services for patients. All patients presenting for surgery are required to have a COVID-19 test result within 96 hours of their scheduled surgery. [facility] is providing this test free of charge to the patient. Instructions for COVID-19 Testing:     Patients will receive a call from Pre-Admission Testing 4-5 days prior to surgery to schedule a date and time to come to the [address] [place] [address] Drive for their COVID-19 test   Patients are advised to self-quarantine after testing until their scheduled surgery   Once on site, patients will be registered and receive COVID test in their vehicle   If a patient is scheduled for normal Pre Admission Testing 96 hours from date of surgery, the patient will still have their COVID test done at the [address] [place] [address] located at [address] [place] [address] Positive results will be shared with the surgeon and anesthesiologist and may result in cancellation of the elective procedure    Testing Hours and Location:   Address:  [address] [place] [address] Up Pre Admission [address] [facility] in the Discharge Lot on [facility] (Map Attached)  [address], [address]   Hours: Monday- Friday 7a-3p, Saturday and Sunday 7a-10a    PAT Phone Number: [phone]              Patient Information Regarding COVID Restrictions    Patients are advised to self-quarantine after COVID testing up to the day of the scheduled procedure. Day of Procedure     Please park in the parking deck or any designated visitor parking lot.  Enter the facility through the Main Entrance of the hospital.   A temperature check and appropriate symptom/exposure screening will be done prior to entry to the facility.    On the day of surgery, please provide the cell phone number of the person who will be waiting for you to the Patient Access representative at the time of registration.  Please wear a mask on the day of your procedure.  We are now allowing one designated visitor per stay. Pediatric patients may have 2 designated visitors. This one person may come in with you on the day of your procedure.  No visitors under the age of 13.  The designated visitor must also wear a mask.  Once your procedure and the immediate recovery period is completed, a nurse in the recovery area will contact your designated visitor to inform them of your room number or to otherwise review other pertinent information regarding your care.  Social distancing practices are to be adhered to in waiting areas and the cafeteria. The patient was contacted  in person. He  verbalize  understanding of all instructions does not  need reinforcement.

## 2021-12-08 ENCOUNTER — DOCUMENTATION ONLY (OUTPATIENT)
Dept: SURGERY | Age: 81
End: 2021-12-08

## 2021-12-08 LAB
SARS-COV-2, XPLCVT: NOT DETECTED
SOURCE, COVRS: NORMAL

## 2021-12-08 NOTE — PERIOP NOTES
CALLED DR. MARTINES'S OFFICE WITH ABNORMAL LABS, REACHED VOICEMAIL, LEFT MESSAGE WITH ABNORMAL LAB RESULTS. HEMOGLOBIN 10.4 (L)    HEMATOCRIT 32.3 (L)    LEFT MY NUMBER IF THEY HAVE ANY QUESTIONS. AND ALSO SURGERY DATE 12/9/21.

## 2021-12-09 ENCOUNTER — ANESTHESIA EVENT (OUTPATIENT)
Dept: MEDSURG UNIT | Age: 81
End: 2021-12-09
Payer: MEDICARE

## 2021-12-09 ENCOUNTER — HOSPITAL ENCOUNTER (OUTPATIENT)
Age: 81
Setting detail: OUTPATIENT SURGERY
Discharge: HOME | End: 2021-12-09
Attending: SURGERY | Admitting: SURGERY
Payer: MEDICARE

## 2021-12-09 ENCOUNTER — ANESTHESIA (OUTPATIENT)
Dept: MEDSURG UNIT | Age: 81
End: 2021-12-09
Payer: MEDICARE

## 2021-12-09 VITALS
HEART RATE: 68 BPM | SYSTOLIC BLOOD PRESSURE: 114 MMHG | BODY MASS INDEX: 22.89 KG/M2 | TEMPERATURE: 97.8 F | DIASTOLIC BLOOD PRESSURE: 71 MMHG | OXYGEN SATURATION: 96 % | WEIGHT: 146.16 LBS | RESPIRATION RATE: 12 BRPM

## 2021-12-09 DIAGNOSIS — N63.10 BREAST MASS, RIGHT: ICD-10-CM

## 2021-12-09 PROCEDURE — 74011000250 HC RX REV CODE- 250: Performed by: NURSE PRACTITIONER

## 2021-12-09 PROCEDURE — 19120 REMOVAL OF BREAST LESION: CPT | Performed by: SURGERY

## 2021-12-09 PROCEDURE — 77030010507 HC ADH SKN DERMBND J&J -B: Performed by: SURGERY

## 2021-12-09 PROCEDURE — 88307 TISSUE EXAM BY PATHOLOGIST: CPT

## 2021-12-09 PROCEDURE — 74011250636 HC RX REV CODE- 250/636: Performed by: NURSE PRACTITIONER

## 2021-12-09 PROCEDURE — 77030031139 HC SUT VCRL2 J&J -A: Performed by: SURGERY

## 2021-12-09 PROCEDURE — 76210000037 HC AMBSU PH I REC 2 TO 2.5 HR: Performed by: SURGERY

## 2021-12-09 PROCEDURE — 77030041680 HC PNCL ELECSURG SMK EVAC CNMD -B: Performed by: SURGERY

## 2021-12-09 PROCEDURE — 77030010509 HC AIRWY LMA MSK TELE -A: Performed by: ANESTHESIOLOGY

## 2021-12-09 PROCEDURE — 88341 IMHCHEM/IMCYTCHM EA ADD ANTB: CPT

## 2021-12-09 PROCEDURE — 76998 US GUIDE INTRAOP: CPT | Performed by: SURGERY

## 2021-12-09 PROCEDURE — 76210000046 HC AMBSU PH II REC FIRST 0.5 HR: Performed by: SURGERY

## 2021-12-09 PROCEDURE — 74011000250 HC RX REV CODE- 250: Performed by: SURGERY

## 2021-12-09 PROCEDURE — 76030000000 HC AMB SURG OR TIME 0.5 TO 1: Performed by: SURGERY

## 2021-12-09 PROCEDURE — 77030011267 HC ELECTRD BLD COVD -A: Performed by: SURGERY

## 2021-12-09 PROCEDURE — 76060000061 HC AMB SURG ANES 0.5 TO 1 HR: Performed by: SURGERY

## 2021-12-09 PROCEDURE — 77030002996 HC SUT SLK J&J -A: Performed by: SURGERY

## 2021-12-09 PROCEDURE — 77030040361 HC SLV COMPR DVT MDII -B: Performed by: SURGERY

## 2021-12-09 PROCEDURE — 77030002933 HC SUT MCRYL J&J -A: Performed by: SURGERY

## 2021-12-09 PROCEDURE — 2709999900 HC NON-CHARGEABLE SUPPLY: Performed by: SURGERY

## 2021-12-09 PROCEDURE — 88360 TUMOR IMMUNOHISTOCHEM/MANUAL: CPT

## 2021-12-09 PROCEDURE — 88342 IMHCHEM/IMCYTCHM 1ST ANTB: CPT

## 2021-12-09 RX ORDER — LIDOCAINE HYDROCHLORIDE 20 MG/ML
INJECTION, SOLUTION EPIDURAL; INFILTRATION; INTRACAUDAL; PERINEURAL AS NEEDED
Status: DISCONTINUED | OUTPATIENT
Start: 2021-12-09 | End: 2021-12-09 | Stop reason: HOSPADM

## 2021-12-09 RX ORDER — ACETAMINOPHEN 325 MG/1
650 TABLET ORAL ONCE
Status: DISCONTINUED | OUTPATIENT
Start: 2021-12-09 | End: 2021-12-09 | Stop reason: HOSPADM

## 2021-12-09 RX ORDER — SODIUM CHLORIDE 0.9 % (FLUSH) 0.9 %
5-40 SYRINGE (ML) INJECTION EVERY 8 HOURS
Status: DISCONTINUED | OUTPATIENT
Start: 2021-12-09 | End: 2021-12-09 | Stop reason: HOSPADM

## 2021-12-09 RX ORDER — DEXAMETHASONE SODIUM PHOSPHATE 4 MG/ML
INJECTION, SOLUTION INTRA-ARTICULAR; INTRALESIONAL; INTRAMUSCULAR; INTRAVENOUS; SOFT TISSUE AS NEEDED
Status: DISCONTINUED | OUTPATIENT
Start: 2021-12-09 | End: 2021-12-09 | Stop reason: HOSPADM

## 2021-12-09 RX ORDER — MIDAZOLAM HYDROCHLORIDE 1 MG/ML
0.5 INJECTION, SOLUTION INTRAMUSCULAR; INTRAVENOUS
Status: CANCELLED | OUTPATIENT
Start: 2021-12-09

## 2021-12-09 RX ORDER — SODIUM CHLORIDE, SODIUM LACTATE, POTASSIUM CHLORIDE, CALCIUM CHLORIDE 600; 310; 30; 20 MG/100ML; MG/100ML; MG/100ML; MG/100ML
INJECTION, SOLUTION INTRAVENOUS
Status: DISCONTINUED | OUTPATIENT
Start: 2021-12-09 | End: 2021-12-09 | Stop reason: HOSPADM

## 2021-12-09 RX ORDER — SODIUM CHLORIDE 0.9 % (FLUSH) 0.9 %
5-40 SYRINGE (ML) INJECTION AS NEEDED
Status: DISCONTINUED | OUTPATIENT
Start: 2021-12-09 | End: 2021-12-09 | Stop reason: HOSPADM

## 2021-12-09 RX ORDER — PROPOFOL 10 MG/ML
INJECTION, EMULSION INTRAVENOUS AS NEEDED
Status: DISCONTINUED | OUTPATIENT
Start: 2021-12-09 | End: 2021-12-09 | Stop reason: HOSPADM

## 2021-12-09 RX ORDER — ONDANSETRON 2 MG/ML
4 INJECTION INTRAMUSCULAR; INTRAVENOUS AS NEEDED
Status: CANCELLED | OUTPATIENT
Start: 2021-12-09

## 2021-12-09 RX ORDER — LIDOCAINE HYDROCHLORIDE AND EPINEPHRINE 10; 10 MG/ML; UG/ML
30 INJECTION, SOLUTION INFILTRATION; PERINEURAL ONCE
Status: DISCONTINUED | OUTPATIENT
Start: 2021-12-09 | End: 2021-12-09 | Stop reason: HOSPADM

## 2021-12-09 RX ORDER — MIDAZOLAM HYDROCHLORIDE 1 MG/ML
INJECTION, SOLUTION INTRAMUSCULAR; INTRAVENOUS AS NEEDED
Status: DISCONTINUED | OUTPATIENT
Start: 2021-12-09 | End: 2021-12-09 | Stop reason: HOSPADM

## 2021-12-09 RX ORDER — OXYCODONE AND ACETAMINOPHEN 5; 325 MG/1; MG/1
1 TABLET ORAL AS NEEDED
Status: CANCELLED | OUTPATIENT
Start: 2021-12-09

## 2021-12-09 RX ORDER — LIDOCAINE HYDROCHLORIDE 10 MG/ML
0.1 INJECTION, SOLUTION EPIDURAL; INFILTRATION; INTRACAUDAL; PERINEURAL AS NEEDED
Status: DISCONTINUED | OUTPATIENT
Start: 2021-12-09 | End: 2021-12-09 | Stop reason: HOSPADM

## 2021-12-09 RX ORDER — FENTANYL CITRATE 50 UG/ML
50 INJECTION, SOLUTION INTRAMUSCULAR; INTRAVENOUS AS NEEDED
Status: DISCONTINUED | OUTPATIENT
Start: 2021-12-09 | End: 2021-12-09 | Stop reason: HOSPADM

## 2021-12-09 RX ORDER — FENTANYL CITRATE 50 UG/ML
INJECTION, SOLUTION INTRAMUSCULAR; INTRAVENOUS AS NEEDED
Status: DISCONTINUED | OUTPATIENT
Start: 2021-12-09 | End: 2021-12-09 | Stop reason: HOSPADM

## 2021-12-09 RX ORDER — MORPHINE SULFATE 2 MG/ML
2 INJECTION, SOLUTION INTRAMUSCULAR; INTRAVENOUS
Status: CANCELLED | OUTPATIENT
Start: 2021-12-09

## 2021-12-09 RX ORDER — HYDROCODONE BITARTRATE AND ACETAMINOPHEN 5; 325 MG/1; MG/1
1 TABLET ORAL
Qty: 15 TABLET | Refills: 0 | Status: SHIPPED | OUTPATIENT
Start: 2021-12-09 | End: 2021-12-16

## 2021-12-09 RX ORDER — SODIUM CHLORIDE, SODIUM LACTATE, POTASSIUM CHLORIDE, CALCIUM CHLORIDE 600; 310; 30; 20 MG/100ML; MG/100ML; MG/100ML; MG/100ML
125 INJECTION, SOLUTION INTRAVENOUS CONTINUOUS
Status: DISCONTINUED | OUTPATIENT
Start: 2021-12-09 | End: 2021-12-09 | Stop reason: HOSPADM

## 2021-12-09 RX ORDER — BUPIVACAINE HYDROCHLORIDE AND EPINEPHRINE 5; 5 MG/ML; UG/ML
30 INJECTION, SOLUTION EPIDURAL; INTRACAUDAL; PERINEURAL ONCE
Status: DISCONTINUED | OUTPATIENT
Start: 2021-12-09 | End: 2021-12-09 | Stop reason: HOSPADM

## 2021-12-09 RX ORDER — SODIUM CHLORIDE 9 MG/ML
25 INJECTION, SOLUTION INTRAVENOUS CONTINUOUS
Status: DISCONTINUED | OUTPATIENT
Start: 2021-12-09 | End: 2021-12-09 | Stop reason: HOSPADM

## 2021-12-09 RX ORDER — SODIUM CHLORIDE 0.9 % (FLUSH) 0.9 %
5-40 SYRINGE (ML) INJECTION EVERY 8 HOURS
Status: CANCELLED | OUTPATIENT
Start: 2021-12-09

## 2021-12-09 RX ORDER — HYDROMORPHONE HYDROCHLORIDE 1 MG/ML
0.2 INJECTION, SOLUTION INTRAMUSCULAR; INTRAVENOUS; SUBCUTANEOUS
Status: CANCELLED | OUTPATIENT
Start: 2021-12-09

## 2021-12-09 RX ORDER — SODIUM CHLORIDE, SODIUM LACTATE, POTASSIUM CHLORIDE, CALCIUM CHLORIDE 600; 310; 30; 20 MG/100ML; MG/100ML; MG/100ML; MG/100ML
125 INJECTION, SOLUTION INTRAVENOUS CONTINUOUS
Status: CANCELLED | OUTPATIENT
Start: 2021-12-09

## 2021-12-09 RX ORDER — MIDAZOLAM HYDROCHLORIDE 1 MG/ML
1 INJECTION, SOLUTION INTRAMUSCULAR; INTRAVENOUS AS NEEDED
Status: DISCONTINUED | OUTPATIENT
Start: 2021-12-09 | End: 2021-12-09 | Stop reason: HOSPADM

## 2021-12-09 RX ORDER — FENTANYL CITRATE 50 UG/ML
25 INJECTION, SOLUTION INTRAMUSCULAR; INTRAVENOUS
Status: CANCELLED | OUTPATIENT
Start: 2021-12-09

## 2021-12-09 RX ORDER — ONDANSETRON 2 MG/ML
INJECTION INTRAMUSCULAR; INTRAVENOUS AS NEEDED
Status: DISCONTINUED | OUTPATIENT
Start: 2021-12-09 | End: 2021-12-09 | Stop reason: HOSPADM

## 2021-12-09 RX ORDER — SODIUM CHLORIDE 0.9 % (FLUSH) 0.9 %
5-40 SYRINGE (ML) INJECTION AS NEEDED
Status: CANCELLED | OUTPATIENT
Start: 2021-12-09

## 2021-12-09 RX ORDER — DIPHENHYDRAMINE HYDROCHLORIDE 50 MG/ML
12.5 INJECTION, SOLUTION INTRAMUSCULAR; INTRAVENOUS AS NEEDED
Status: CANCELLED | OUTPATIENT
Start: 2021-12-09 | End: 2021-12-09

## 2021-12-09 RX ADMIN — LIDOCAINE HYDROCHLORIDE 60 MG: 20 INJECTION, SOLUTION EPIDURAL; INFILTRATION; INTRACAUDAL; PERINEURAL at 12:47

## 2021-12-09 RX ADMIN — ONDANSETRON HYDROCHLORIDE 4 MG: 2 INJECTION, SOLUTION INTRAMUSCULAR; INTRAVENOUS at 12:50

## 2021-12-09 RX ADMIN — PROPOFOL 120 MG: 10 INJECTION, EMULSION INTRAVENOUS at 12:47

## 2021-12-09 RX ADMIN — SODIUM CHLORIDE 60 MCG/MIN: 9 INJECTION, SOLUTION INTRAVENOUS at 12:50

## 2021-12-09 RX ADMIN — FENTANYL CITRATE 25 MCG: 50 INJECTION, SOLUTION INTRAMUSCULAR; INTRAVENOUS at 13:10

## 2021-12-09 RX ADMIN — MIDAZOLAM 2 MG: 1 INJECTION INTRAMUSCULAR; INTRAVENOUS at 12:40

## 2021-12-09 RX ADMIN — FENTANYL CITRATE 25 MCG: 50 INJECTION, SOLUTION INTRAMUSCULAR; INTRAVENOUS at 12:47

## 2021-12-09 RX ADMIN — DEXAMETHASONE SODIUM PHOSPHATE 4 MG: 4 INJECTION, SOLUTION INTRAMUSCULAR; INTRAVENOUS at 12:50

## 2021-12-09 RX ADMIN — SODIUM CHLORIDE, SODIUM LACTATE, POTASSIUM CHLORIDE, AND CALCIUM CHLORIDE: 600; 310; 30; 20 INJECTION, SOLUTION INTRAVENOUS at 12:50

## 2021-12-09 NOTE — ANESTHESIA PREPROCEDURE EVALUATION
Relevant Problems   No relevant active problems       Anesthetic History   No history of anesthetic complications            Review of Systems / Medical History  Patient summary reviewed, nursing notes reviewed and pertinent labs reviewed    Pulmonary  Within defined limits                 Neuro/Psych   Within defined limits           Cardiovascular    Hypertension                   GI/Hepatic/Renal  Within defined limits              Endo/Other        Cancer     Other Findings              Physical Exam    Airway  Mallampati: II  TM Distance: > 6 cm  Neck ROM: normal range of motion   Mouth opening: Normal     Cardiovascular  Regular rate and rhythm,  S1 and S2 normal,  no murmur, click, rub, or gallop             Dental  No notable dental hx       Pulmonary  Breath sounds clear to auscultation               Abdominal  GI exam deferred       Other Findings            Anesthetic Plan    ASA: 2  Anesthesia type: general          Induction: Intravenous  Anesthetic plan and risks discussed with: Patient

## 2021-12-09 NOTE — ANESTHESIA POSTPROCEDURE EVALUATION
Procedure(s):  RIGHT BREAST EXCISIONAL BIOPSY WITH ULTRASOUND. general    Anesthesia Post Evaluation        Patient participation: complete - patient participated  Level of consciousness: awake  Pain management: adequate  Airway patency: patent  Anesthetic complications: no  Cardiovascular status: hemodynamically stable  Respiratory status: acceptable  Hydration status: acceptable  Comments: The patient is ready for PACU discharge. Karen Fernando DO                   Post anesthesia nausea and vomiting:  controlled      INITIAL Post-op Vital signs:   Vitals Value Taken Time   /62 12/09/21 1341   Temp 36.6 °C (97.8 °F) 12/09/21 1341   Pulse 79 12/09/21 1426   Resp 12 12/09/21 1426   SpO2 100 % 12/09/21 1426   Vitals shown include unvalidated device data.

## 2021-12-09 NOTE — OP NOTES
[address]   OPERATIVE REPORT    Name:  Carolina Ferreira  MR#:  [id number]  :  1940  ACCOUNT #:  [de-identified]  DATE OF SERVICE:  2021    PREOPERATIVE DIAGNOSIS:  Right breast mass. POSTOPERATIVE DIAGNOSIS:  Right breast mass. PROCEDURE PERFORMED:  Right breast biopsy with intraoperative ultrasound. SURGEON:  Hortensia Hopper MD    ASSISTANT:  Cristy Barnes    ANESTHESIA:  General.    COMPLICATIONS:  None. SPECIMENS REMOVED:  Right breast mass. IMPLANTS:  None. ESTIMATED BLOOD LOSS:  Minimal.    INDICATIONS:  The patient is an 25-year-old male who has a longstanding right breast mass. He had a core biopsy in my office which revealed inflammatory lesion but no atypia, no evidence of malignancy. He then was diagnosed with a colorectal cancer and had a PET scan as part of his staging workup where the breast mass lit up on the PET scan. Therefore, decision was made in conjunction with his colorectal surgeon to proceed with excisional biopsy to ensure there was no relationship between his colorectal malignancy. PROCEDURE:  After satisfactory induction of general LMA anesthesia, the patient was prepped and draped in sterile fashion. An inverted omega incision was made around the nipple areolar complex after intraoperative ultrasound was performed and the breast marked. The mass was identified and it was excised with a combination of sharp and blunt dissection in its entirety. The specimen was removed. It was oriented and sent to Pathology. The incision was then anesthetized with 0.5% Marcaine and closed in 2 layers of 3-0 Vicryl and a running subcuticular 4-0 Monocryl on the skin. The patient tolerated the procedure well with no immediate complications. He was taken to the recovery room in stable condition.       Brianna Echeverria MD      [id number]/[id number]/  D:  2021 14:07  T:  2021 16:23  JOB #:  [id number]  CC:  MD Angelica Bond MD Umesh Cadet MD

## 2021-12-09 NOTE — BRIEF OP NOTE
Brief Postoperative Note    Patient: Isa Brian  YOB: 1940  MRN: [id number]    Date of Procedure: 12/9/2021     Pre-Op Diagnosis: RIGHT BREAST MASS    Post-Op Diagnosis: Same as preoperative diagnosis.       Procedure(s):  RIGHT BREAST EXCISIONAL BIOPSY WITH ULTRASOUND    Surgeon(s):  Enrique Collins MD    Surgical Assistant: Surg Asst-1: Adrianna Bravo    Anesthesia: General     Estimated Blood Loss (mL): Minimal    Complications: None    Specimens:   ID Type Source Tests Collected by Time Destination   1 : RIGHT BREAST MASS Fresh Breast  Enrique Collins MD 12/9/2021 1309 Pathology        Implants: * No implants in log *    Drains: * No LDAs found *    Findings: right breast mass    Electronically Signed by Samantha Sue MD on 59/8/6568 at 1:46 PM

## 2021-12-09 NOTE — PERIOP NOTES
Have notified Santa Pierce, son-in-law with update of PACU care and care at home. He will be at hospital around  4 pm to  patient.

## 2021-12-09 NOTE — ROUTINE PROCESS
Patient: Dylan Gonzalez MRN: [id number]  SSN: [id number]   YOB: 1940  Age: [de-identified] y.o. Sex: male     Patient is status post Procedure(s):  RIGHT BREAST EXCISIONAL BIOPSY WITH ULTRASOUND.     Surgeon(s) and Role:     * Julio César Fry MD - Primary    Local/Dose/Irrigation:  SEE MAR                  Peripheral IV 12/09/21 Left Hand (Active)   Site Assessment Clean, dry, & intact 12/09/21 1209   Phlebitis Assessment 0 12/09/21 1209   Infiltration Assessment 0 12/09/21 1209   Dressing Status Clean, dry, & intact 12/09/21 1209   Dressing Type Transparent 12/09/21 1209   Hub Color/Line Status Infusing 12/09/21 1209            Airway - Endotracheal Tube 12/09/21 Oral (Active)                   Dressing/Packing:  Incision 12/09/21 Breast Right-Dressing/Treatment: Surgical glue (12/09/21 1307)    Splint/Cast:  ]    Other:

## 2021-12-09 NOTE — H&P
HISTORY OF PRESENT ILLNESS  Constanza Perales is a [de-identified] y.o. male. HPI  ESTABLISHED Patient here for follow up RIGHT breast mass. No changes since last visit and denies pain tot he RIGHT breast area.      03/10/21: Right breast mass, core biopsy. PATH: Inspissated and necrotic material/debris with reactive fibrosis, macrophages and calcifications. Comment: The etiology of this process is uncertain. Ashley Spivey may be related to an infected/ruptured cystic lesion.  Clinical and radiographic correlation is recommended with followup, as clinically indicated.              Past Medical History:   Diagnosis Date    Arthritis      Cancer ([address]       COLO-RECTAL    Chronic pain      Hypertension                 Past Surgical History:   Procedure Laterality Date    HX APPENDECTOMY        HX CHOLECYSTECTOMY        HX COLOSTOMY   2013     COLOSTOMY    HX GI   1995     COLON RESECTION    HX TONSILLECTOMY        HX UROLOGICAL   2017     VAPORZATION         Social History            Socioeconomic History    Marital status:        Spouse name: Not on file    Number of children: Not on file    Years of education: Not on file    Highest education level: Not on file   Occupational History    Not on file   Tobacco Use    Smoking status: Former Smoker    Smokeless tobacco: Never Used   Substance and Sexual Activity    Alcohol use: Yes       Comment: DAILY WINE    Drug use: No    Sexual activity: Not on file   Other Topics Concern    Not on file   Social History Narrative    Not on file      Social Determinants of Health          Financial Resource Strain:     Difficulty of Paying Living Expenses:    Food Insecurity:     Worried About Running Out of Food in the Last Year:     [address] in the Last Year:    Transportation Needs:     Lack of Transportation (Medical):      Lack of Transportation (Non-Medical):    Physical Activity:     Days of Exercise per Week:     Minutes of Exercise per Session:    Stress:  Feeling of Stress :    Social Connections:     Frequency of Communication with Friends and Family:     Frequency of Social Gatherings with Friends and Family:     Attends Confucianism Services:     Active Member of Clubs or Organizations:     Attends Club or Organization Meetings:     Marital Status:    Intimate Partner Violence:     Fear of Current or Ex-Partner:     Emotionally Abused:     Physically Abused:     Sexually Abused:                 Current Outpatient Medications on File Prior to Visit   Medication Sig Dispense Refill    hydroCHLOROthiazide (HYDRODIURIL) 12.5 mg tablet Take 12.5 mg by mouth daily.        tamsulosin (FLOMAX) 0.4 mg capsule Take 0.4 mg by mouth daily.        CHOLECALCIFEROL, VITAMIN D3, (VITAMIN D3 PO) Take 1.25 mg by mouth every seven (7) days. WEDNESDAY        CYANOCOBALAMIN, VITAMIN B-12, (VITAMIN B-12 INJECTION) by Injection route every month.        MULTIVITAMIN PO Take  by mouth daily.        doxycycline (ADOXA) 50 mg tablet Take 50 mg by mouth daily. (Patient not taking: Reported on 10/8/2021)          No current facility-administered medications on file prior to visit.         No Known Allergies        ROS     Physical Exam  Exam conducted with a chaperone present. Cardiovascular:      Rate and Rhythm: Normal rate and regular rhythm. Heart sounds: Normal heart sounds. Pulmonary:      Breath sounds: Normal breath sounds. Chest:      Breasts: Breasts are symmetrical.         Right: Mass present. No swelling, bleeding, inverted nipple, nipple discharge, skin change or tenderness. Left: Normal. No swelling, bleeding, inverted nipple, mass, nipple discharge, skin change or tenderness. Lymphadenopathy:      Cervical:      Right cervical: No superficial, deep or posterior cervical adenopathy. Left cervical: No superficial, deep or posterior cervical adenopathy.       Upper Body:      Right upper body: No supraclavicular or axillary adenopathy. Left upper body: No supraclavicular or axillary adenopathy.       BREAST ULTRASOUND  Indication: RIGHT breast mass retroareolar  Technique: The area was scanned using a high-frequency linear-array near-field transducer  Findings:  3.0 x 1.8 x 2.18cm mass, homogeneous and smooth, with well placed bx clip  Impression: Benign mass per bx  Disposition: Re-measure in 6 months        ASSESSMENT and PLAN      ICD-10-CM ICD-9-CM     1. Breast mass, right  N63.10 611.72        Patient presents to follow up on RIGHT breast mass, and is doing well overall. Palpable mass on exam at RIGHT breast retroareolar. US visualizes 3.0 x 1.8 x 2.18cm mass, homogeneous and smooth, with well placed bx clip. Will re-measure again in 6 months to make sure mass is not enlarging. F/U in 6 months. This plan was reviewed with the patient and patient agrees. All questions were answered.

## 2021-12-09 NOTE — DISCHARGE INSTRUCTIONS
Discharge Instructions from Dr. Bandar Jones    · I will call you with the pathology results, typically within 1 week from today. · You may shower, but no hot tubs, swimming pools, or baths until your incision is healed. · No heavy lifting with the affected extremity (nothing greater than 5 pounds), and limit its use for the next 4-5 days. · You may use an ice pack for comfort for the next couple of days, but do not place ice directly on the skin. Rather, use a towel or clothing to serve as a barrier between skin and ice to prevent injury. · If I placed a drain, follow the drain instructions provided, especially as you keep a record of the drain output. · Follow medication instructions carefully. · Watch for signs of infection as listed below. · Redness  · Swelling  · Drainage from the incision or from your nipple that appears infected  · Fever over 101 degrees for consecutive readings, or over 99.5 if you are currently undergoing chemotherapy. · Call our office (number is below) for a follow-up appointment. · If you have any problems, our phone number is [phone]. DISCHARGE SUMMARY from Nurse   POST OP ANESTHESIA INSTRUCTIONS    PATIENT INSTRUCTIONS:    After general anesthesia or intravenous sedation, for 24 hours or while taking prescription Narcotics:  · Limit your activities  · Do not drive and operate hazardous machinery  · Do not make important personal or business decisions  · Do  not drink alcoholic beverages  · If you have not urinated within 8 hours after discharge, please contact your surgeon on call.     Report the following to your surgeon:  · Excessive pain, swelling, redness or odor of or around the surgical area  · Temperature over 100.5  · Nausea and vomiting lasting longer than 4 hours or if unable to take medications  · Any signs of decreased circulation or nerve impairment to extremity: change in color, persistent  numbness, tingling, coldness or increase pain  · Any questions    What to do at Home:      If you have any concerns, please contact Dr Preeti Dupree    *  Please give a list of your current medications to your Primary Care Provider. *  Please update this list whenever your medications are discontinued, doses are      changed, or new medications (including over-the-counter products) are added. *  Please carry medication information at all times in case of emergency situations. These are general instructions for a healthy lifestyle:    No smoking/ No tobacco products/ Avoid exposure to second hand smoke  Surgeon General's Warning:  Quitting smoking now greatly reduces serious risk to your health. Obesity, smoking, and sedentary lifestyle greatly increases your risk for illness    A healthy diet, regular physical exercise & weight monitoring are important for maintaining a healthy lifestyle    You may be retaining fluid if you have a history of heart failure or if you experience any of the following symptoms:  Weight gain of 3 pounds or more overnight or 5 pounds in a week, increased swelling in our hands or feet or shortness of breath while lying flat in bed. Please call your doctor as soon as you notice any of these symptoms; do not wait until your next office visit. The discharge information has been reviewed with the patient and caregiver. The patient and caregiver verbalized understanding. Discharge medications reviewed with the patient and caregiver and appropriate educational materials and side effects teaching were provided.   ___________________________________________________________________________________________________________________________________ WDL

## 2021-12-16 ENCOUNTER — TELEPHONE (OUTPATIENT)
Dept: SURGERY | Age: 81
End: 2021-12-16

## 2021-12-20 ENCOUNTER — NURSE NAVIGATOR (OUTPATIENT)
Dept: CASE MANAGEMENT | Age: 81
End: 2021-12-20

## 2021-12-20 NOTE — PROGRESS NOTES
[employer]  Breast Navigator Encounter    Name:    Vincent Avila  Age:    [de-identified] y.o. Diagnosis:    RIGHT breast cancer, rectal adenocarcinoma    Interdisciplinary Team:  Med-Onc:    Surg-Onc:    Dr. Elton Hernandez:    Plastics:    :     Nurse Navigator:  Alyx Mejia, RN, BSN, [facility]      Encounter type:  []Patient Initiated  []Navigator Follow-up []Pre-op  []Post-op  []Check-in Prior to First Treatment []Treatment Modality Change  []Survivorship Transition [x]Other:   Initial Navigator Encounter      Narrative:    Reached out to patient for initial navigator contact after his recent diagnosis of RIGHT breast cancer. He was also diagnosed with rectal carcinoma in early November. I know this patient from when I worked in [facility] office. He was not available, so I left a message asking him to return my call. I let him know that I was following up regarding his breast cancer, but that I also wanted to refer him to my colleague, Dylan Kelley, who is a colorectal cancer navigator to see if there is anything she can help with. I will wait for him to call back.           Alyx Mejia, RN, BSN, [facility]  Oncology Breast Navigator     [place]  [address] [facility] [address]  W: [phone]  F: [phone]  [email or web address]  Good Help to Those in [facility]

## 2021-12-22 ENCOUNTER — TRANSCRIBE ORDER (OUTPATIENT)
Dept: REGISTRATION | Age: 81
End: 2021-12-22

## 2021-12-22 ENCOUNTER — DOCUMENTATION ONLY (OUTPATIENT)
Dept: SURGERY | Age: 81
End: 2021-12-22

## 2021-12-22 DIAGNOSIS — Z01.812 PRE-PROCEDURE LAB EXAM: Primary | ICD-10-CM

## 2021-12-22 DIAGNOSIS — C80.1 ADENOCARCINOMA (HCC): ICD-10-CM

## 2021-12-22 DIAGNOSIS — C50.921 MALIGNANT NEOPLASM OF RIGHT MALE BREAST, UNSPECIFIED ESTROGEN RECEPTOR STATUS, UNSPECIFIED SITE OF BREAST (HCC): Primary | ICD-10-CM

## 2021-12-22 NOTE — PROGRESS NOTES
Verbally informed patient surgery   SM   ( LEFT MESSAGE TO CALL OFFICE BACK FOR SURGERY INFORMATION)    Date: 12/30/2021 @ 10:45 AM  Arrive: 8:45 AM  report to 1st floor outpatient registration day of surgery.     PAT: NURSE WILL CALL   Arrive: NURSE  report to [facility], [address]; [address] [address] [phone]      Gave following instructions:  NPO after Midnight the night before  Shower in AM, no lotion, deodorant, powder,perfume or makeup  Will need  morning of the surgery

## 2021-12-23 ENCOUNTER — HOSPITAL ENCOUNTER (OUTPATIENT)
Dept: PREADMISSION TESTING | Age: 81
Discharge: HOME | End: 2021-12-23
Payer: MEDICARE

## 2021-12-23 LAB
ANION GAP SERPL CALC-SCNC: 7 MMOL/L (ref 5–15)
BASOPHILS # BLD: 0 K/UL (ref 0–0.1)
BASOPHILS NFR BLD: 0 % (ref 0–1)
BUN SERPL-MCNC: 19 MG/DL (ref 6–20)
BUN/CREAT SERPL: 17 (ref 12–20)
CALCIUM SERPL-MCNC: 9 MG/DL (ref 8.5–10.1)
CHLORIDE SERPL-SCNC: 104 MMOL/L (ref 97–108)
CO2 SERPL-SCNC: 26 MMOL/L (ref 21–32)
CREAT SERPL-MCNC: 1.12 MG/DL (ref 0.7–1.3)
DIFFERENTIAL METHOD BLD: ABNORMAL
EOSINOPHIL # BLD: 0.1 K/UL (ref 0–0.4)
EOSINOPHIL NFR BLD: 2 % (ref 0–7)
ERYTHROCYTE [DISTWIDTH] IN BLOOD BY AUTOMATED COUNT: 14.1 % (ref 11.5–14.5)
GLUCOSE SERPL-MCNC: 96 MG/DL (ref 65–100)
HCT VFR BLD AUTO: 34.5 % (ref 36.6–50.3)
HGB BLD-MCNC: 10.8 G/DL (ref 12.1–17)
IMM GRANULOCYTES # BLD AUTO: 0 K/UL (ref 0–0.04)
IMM GRANULOCYTES NFR BLD AUTO: 0 % (ref 0–0.5)
LYMPHOCYTES # BLD: 0.9 K/UL (ref 0.8–3.5)
LYMPHOCYTES NFR BLD: 11 % (ref 12–49)
MCH RBC QN AUTO: 29.1 PG (ref 26–34)
MCHC RBC AUTO-ENTMCNC: 31.3 G/DL (ref 30–36.5)
MCV RBC AUTO: 93 FL (ref 80–99)
MONOCYTES # BLD: 0.8 K/UL (ref 0–1)
MONOCYTES NFR BLD: 9 % (ref 5–13)
NEUTS SEG # BLD: 6.5 K/UL (ref 1.8–8)
NEUTS SEG NFR BLD: 78 % (ref 32–75)
NRBC # BLD: 0 K/UL (ref 0–0.01)
NRBC BLD-RTO: 0 PER 100 WBC
PLATELET # BLD AUTO: 370 K/UL (ref 150–400)
PMV BLD AUTO: 9.9 FL (ref 8.9–12.9)
POTASSIUM SERPL-SCNC: 4.1 MMOL/L (ref 3.5–5.1)
RBC # BLD AUTO: 3.71 M/UL (ref 4.1–5.7)
SODIUM SERPL-SCNC: 137 MMOL/L (ref 136–145)
WBC # BLD AUTO: 8.3 K/UL (ref 4.1–11.1)

## 2021-12-23 PROCEDURE — 85025 COMPLETE CBC W/AUTO DIFF WBC: CPT

## 2021-12-23 PROCEDURE — 36415 COLL VENOUS BLD VENIPUNCTURE: CPT

## 2021-12-23 PROCEDURE — 80048 BASIC METABOLIC PNL TOTAL CA: CPT

## 2021-12-23 NOTE — PERIOP NOTES
[address] INSTRUCTIONS    Surgery Date:   12/30/21    Surgery arrival time given by surgeon: NO     If no, [facility]'s staff will call you between 4 PM- 8 PM the day before surgery with your arrival time. If your surgery is on a Monday, we will call you the preceding Friday. Please call [phone] after 8 PM if you did not receive your arrival time. 1. Please report to [facility] Patient Access/Admitting on the 1st floor. Bring your insurance card, photo identification, and any copayment ( if applicable). 2. If you are going home the same day of your surgery, you must have a responsible adult to drive you home. You need to have a responsible adult to stay with you the first 24 hours after surgery and you should not drive a car for 24 hours following your surgery. 3. Do NOT eat any solid foods after midnight the night before surgery including candy, mint or gum. You may drink clear liquids from midnight until 1 hour prior to your arrival. You may drink up to 12 ounces at one time every 4 hours. Please note special instructions, if applicable, below for medications. 4. Do NOT drink alcohol or smoke 24 hours before surgery. STOP smoking for 14 days prior as it helps with breathing and healing after surgery. 5. If you are being admitted to the hospital, please leave personal belongings/luggage in your car until you have an assigned hospital room number. 6. Please wear comfortable clothes. Wear your glasses instead of contacts. We ask that all money, jewelry and valuables be left at home. Wear no make up, particularly mascara, the day of surgery. 7.  All body piercings, rings, and jewelry need to be removed and left at home. Please wear your hair loose or down. Please no pony-tails, buns, or any metal hair accessories. If you shower the morning of surgery, please do not apply any lotions or powders afterwards. You may wear deodorant, unless having breast surgery.   Do not shave any body area within 24 hours of your surgery. 8. Please follow all instructions to avoid any potential surgical cancellation. 9. Should your physical condition change, (i.e. fever, cold, flu, etc.) please notify your surgeon as soon as possible. 10. It is important to be on time. If a situation occurs where you may be delayed, please call:  [phone] / [phone] on the day of surgery. 11. The Preadmission Testing staff can be reached at [phone]. 12. Special instructions: NONE      Current Outpatient Medications   Medication Sig    nystatin (MYCOSTATIN) powder Apply  to affected area as needed.  hydroCHLOROthiazide (HYDRODIURIL) 12.5 mg tablet Take 12.5 mg by mouth every morning.  doxycycline (ADOXA) 50 mg tablet Take 50 mg by mouth nightly.  tamsulosin (FLOMAX) 0.4 mg capsule Take 0.8 mg by mouth daily (after dinner).  CHOLECALCIFEROL, VITAMIN D3, (VITAMIN D3 PO) Take 1.25 mg by mouth every seven (7) days. WEDNESDAY    CYANOCOBALAMIN, VITAMIN B-12, (VITAMIN B-12 INJECTION) by Injection route every three (3) months. EVERY 3 MONTHS (NEXT DUE JAN. 2022)     No current facility-administered medications for this encounter. 1. YOU MUST ONLY TAKE THESE MEDICATIONS THE MORNING OF SURGERY WITH A SIP OF WATER: NONE  2. MEDICATIONS TO TAKE THE MORNING OF SURGERY ONLY IF NEEDED: N/A  3. HOLD these medications BEFORE Surgery: N/A  4. Ask your surgeon/prescribing physician about when/if to STOP taking these medications: N/A  5. Stop all vitamins, herbal medicines and Aspirin containing products 7 days prior to surgery. Stop any non-steroidal anti-inflammatory drugs (i.e. Ibuprofen, Naproxen, Advil, Aleve) 3 days before surgery. You may take Tylenol. 6. If you are currently taking Plavix, Coumadin,or any other blood-thinning/anticoagulant medication contact your prescribing physician for instructions.     Eating and Drinking Before Surgery     You may eat a regular dinner at the usual time on the day before your surgery.  Do NOT eat any solid foods after midnight unless your arrival time at the hospital is 3pm or later.  You may drink clear liquids only from 12 midnight until 1 hours prior to your arrival time at the hospital on the day of your surgery. Do NOT drink alcohol.  Clear liquids include:  o Water  o Fruit juices without pulp( i.e. apple juice)  o Carbonated beverages  o Black coffee (no cream/milk)  o Tea (no cream/milk)  o Gatorade   You may drink up to 12-16 ounces at one time every 4 hours between the hours of midnight and 1 hour before your arrival time at the hospital. Example- if your arrival time at the hospital is 6am, you may drink 12-16 ounces of clear liquids no later than 5am.   If your arrival time at the hospital is 3pm or later, you may eat a light breakfast before 8am.   A light breakfast includes:  o Toast or bagel (no butter)  o Black coffee (no cream/milk)  o Tea (no cream/milk)  o Fruit juices without pulp ( i.e. apple juice)  o Do NOT eat meat, eggs, vegetables or fruit   If you have any questions, please contact your surgeon's office. Preventing Infections Before and After  Your Surgery    IMPORTANT INSTRUCTIONS    Please read and follow these instructions carefully. If you are unable to comply with the below instructions your procedure will be cancelled. You play an important role in your health and preparation for surgery. To reduce the germs on your skin you will need to shower with CHG soap (Chorhexidine gluconate 4%) two times before surgery. CHG soap (Hibiclens, Hex-A-Clens or store brand)   CHG soap will be provided at your Preadmission Testing (PAT) appointment.  If you do not have a PAT appointment before surgery, you may arrange to  CHG soap from our office or purchase CHG soap at a pharmacy, grocery or department store.  You need to purchase TWO 4 ounce bottles to use for your 2 showers. Steps to follow:  1.  Wash your hair with your normal shampoo and your body with regular soap and rinse well to remove shampoo and soap from your skin. 2. Wet a clean washcloth and turn off the shower. 3. Put CHG soap on washcloth and apply to your entire body from the neck down. Do not use on your head, face or private parts(genitals). Do not use CHG soap on open sores, wounds or areas of skin irritation. 4. Wash you body gently for 5 minutes. Do not wash your skin too hard. This soap does not create lather. Pay special attention to your underarms and from your belly button to your feet. 5. Turn the shower back on and rinse well to get CHG soap off your body. 6. Pat your skin dry with a clean, dry towel. Do not apply lotions or moisturizer. 7. Put on clean clothes and sleep on fresh bed sheets and do not allow pets to sleep with you. Shower with CHG soap 2 times before your surgery   The evening before your surgery   The morning of your surgery      Tips to help prevent infections after your surgery:  1. Protect your surgical wound from germs:  ? Hand washing is the most important thing you and your caregivers can do to prevent infections. ? Keep your bandage clean and dry! ? Do not touch your surgical wound. 2. Use clean, freshly washed towels and washcloths every time you shower; do not share bath linens with others. 3. Until your surgical wound is healed, wear clothing and sleep on bed linens each day that are clean and freshly washed. 4. Do not allow pets to sleep in your bed with you or touch your surgical wound. 5. Do not smoke  smoking delays wound healing. This may be a good time to stop smoking. 6. If you have diabetes, it is important for you to manage your blood sugar levels properly before your surgery as well as after your surgery. Poorly managed blood sugar levels slow down wound healing and prevent you from healing completely.         [facility]   Instructions for Pre-Surgery COVID-19 Testing Across our ministry we have established standard guidelines to ensure the health and safety of our patients, residents and associates as we resume elective services for patients. All patients presenting for surgery are required to have a COVID-19 test result within 96 hours of their scheduled surgery. [address] [place] [address] is providing this test free of charge to the patient. Instructions for COVID-19 Testing:     Patients will receive a call from Pre-Admission Testing 4-5 days prior to surgery to schedule a date and time to come to the [address] [place] [address] Drive for their COVID-19 test   Patients are advised to self-quarantine after testing until their scheduled surgery   Once on site, patients will be registered and receive COVID test in their vehicle   If a patient is scheduled for normal Pre Admission Testing 96 hours from date of surgery, the patient will still have their COVID test done at the [address] [place] [address] located at [address] [place] [address] Positive results will be shared with the surgeon and anesthesiologist and may result in cancellation of the elective procedure    Testing Hours and Location:   Address:  [address] [place] [address] Up Pre Admission [facility] in the Discharge Lot on [facility] (Map Attached)  [address] [place] [address], [address] [place] [address] Ave   Hours: Monday- Friday 7a-3p    PAT Phone Number: [phone]              Patient Information Regarding COVID Restrictions    Patients are advised to self-quarantine after COVID testing up to the day of the scheduled procedure. Day of Procedure     Please park in the parking deck or any designated visitor parking lot.  Enter the facility through the Main Entrance of the hospital.   A temperature check and appropriate symptom/exposure screening will be done prior to entry to the facility.    On the day of surgery, please provide the cell phone number of the person who will be waiting for you to the Patient Access representative at the time of registration.  Please wear a mask on the day of your procedure.  We are now allowing one designated visitor per stay. Pediatric patients may have 2 designated visitors. This one person may come in with you on the day of your procedure.  No visitors under the age of 13.  The designated visitor must also wear a mask.  Once your procedure and the immediate recovery period is completed, a nurse in the recovery area will contact your designated visitor to inform them of your room number or to otherwise review other pertinent information regarding your care.  Social distancing practices are to be adhered to in waiting areas and the cafeteria. The patient was contacted  in person. He verbalized understanding of all instructions and does not  need reinforcement.

## 2021-12-23 NOTE — PERIOP NOTES
Name: Karthikeyan Segura  : 1937         Chief Complaint:     Chief Complaint   Patient presents with    Diabetes     routine check       History of Present Illness:      Karthikeyan Segura is a 80 y.o.  male who presents with Diabetes (routine check)      Jamie Zhou is here today for a routine office visit. Hypothyroidism -stable, patient is taking his medication correctly on an intact stomach with water only and waiting 30 minutes to eat. He denies any excessive daytime fatigue or sleepiness. He denies any insomnia or agitation. Diabetes  He presents for his follow-up diabetic visit. He has type 2 diabetes mellitus. His disease course has been stable. There are no hypoglycemic associated symptoms. Pertinent negatives for hypoglycemia include no dizziness, headaches or sweats. Pertinent negatives for diabetes include no blurred vision, no chest pain, no fatigue, no polydipsia, no polyphagia and no polyuria. There are no hypoglycemic complications. Symptoms are stable. Diabetic complications include heart disease and nephropathy. Pertinent negatives for diabetic complications include no CVA or peripheral neuropathy. Risk factors for coronary artery disease include diabetes mellitus, male sex and sedentary lifestyle. Current diabetic treatment includes oral agent (monotherapy). He is compliant with treatment all of the time. His weight is stable. He is following a generally healthy diet. When asked about meal planning, he reported none. He has had a previous visit with a dietitian. He rarely participates in exercise. There is no change in his home blood glucose trend. His breakfast blood glucose range is generally 140-180 mg/dl. An ACE inhibitor/angiotensin II receptor blocker is being taken. He does not see a podiatrist.Eye exam is not current.          Past Medical History:     Past Medical History:   Diagnosis Date    COPD (chronic obstructive pulmonary disease) ([facility]     Diabetes mellitus ([facility]     Hx Preoperative instructions reviewed with patient. Patient given SSI infection FAQS sheet. Given two bottles of skin prep chlorhexidine soap; given written and verbal instructions on use. Patient was given the opportunity to ask questions on the information provided. time.   Psychiatric:         Mood and Affect: Mood normal.         Behavior: Behavior normal.         Data:     Lab Results   Component Value Date     03/23/2021    K 4.8 03/23/2021    CL 97 03/23/2021    CO2 28 03/23/2021    BUN 20 03/23/2021    CREATININE 1.07 03/23/2021    GLUCOSE 168 03/23/2021    PROT 6.4 01/17/2019    LABALBU 3.5 01/17/2019    BILITOT 0.18 01/17/2019    ALKPHOS 94 01/17/2019    AST 49 03/23/2021    ALT 66 03/23/2021     Lab Results   Component Value Date    WBC 9.2 09/08/2020    RBC 4.39 09/08/2020    HGB 13.1 09/08/2020    HCT 42.5 09/08/2020    MCV 96.8 09/08/2020    MCH 29.8 09/08/2020    MCHC 30.8 09/08/2020    RDW 13.9 09/08/2020     09/08/2020    MPV 10.3 09/08/2020     Lab Results   Component Value Date    TSH 6.91 03/09/2020     Lab Results   Component Value Date    CHOL 160 09/08/2020    HDL 61 09/08/2020    LABA1C 7.6 03/23/2021       Assessment/Plan:      Diagnosis Orders   1. Type 2 diabetes mellitus with complication, without long-term current use of insulin (HCC)  CBC Auto Differential    ALT    AST    Basic Metabolic Panel    Lipid Panel    Microalbumin, Ur   2. Hypothyroidism due to Hashimoto's thyroiditis  T4, Free    TSH without Reflex   3. Need for shingles vaccine  zoster recombinant adjuvanted vaccine [facility]) 50 MCG/0.5ML SUSR injection     Continue all medications including the increase in atorvastatin and addition of clopidogrel from cardiologist.    Keep appointment with neurology. Labs and follow-up in 6 months. Sooner if any problems. 1.  Wiltoney Shall received counseling on the following healthy behaviors: nutrition, exercise and medication adherence  2. Patient given educational materials - see patient instructions  3. Was a self-tracking handout given in paper form or via [employer]? No  If yes, see orders or list here. 4.  Discussed use, benefit, and side effects of prescribed medications. Barriers to medication compliance addressed.   All patient

## 2021-12-27 ENCOUNTER — HOSPITAL ENCOUNTER (OUTPATIENT)
Dept: PREADMISSION TESTING | Age: 81
Discharge: HOME | End: 2021-12-27
Attending: SURGERY
Payer: MEDICARE

## 2021-12-27 DIAGNOSIS — Z01.812 PRE-PROCEDURE LAB EXAM: ICD-10-CM

## 2021-12-27 PROCEDURE — U0005 INFEC AGEN DETEC AMPLI PROBE: HCPCS

## 2021-12-27 NOTE — PERIOP NOTES
EKG FAXED TO ANESTHESIA FOR REVIEW    RECEIVED EKG BACK OKAY FOR SURGERY PER DR. FREGOSO [facility] COPY FAXED TO SURGEON'S OFFICE

## 2021-12-28 LAB
SARS-COV-2, XPLCVT: NOT DETECTED
SOURCE, COVRS: NORMAL

## 2021-12-30 ENCOUNTER — HOSPITAL ENCOUNTER (OUTPATIENT)
Dept: GENERAL RADIOLOGY | Age: 81
Discharge: HOME | End: 2021-12-30
Attending: SURGERY

## 2021-12-30 ENCOUNTER — ANESTHESIA EVENT (OUTPATIENT)
Dept: MEDSURG UNIT | Age: 81
End: 2021-12-30

## 2021-12-30 ENCOUNTER — HOSPITAL ENCOUNTER (OUTPATIENT)
Age: 81
Setting detail: OUTPATIENT SURGERY
Discharge: HOME | End: 2021-12-30
Attending: SURGERY | Admitting: SURGERY
Payer: MEDICARE

## 2021-12-30 ENCOUNTER — ANESTHESIA (OUTPATIENT)
Dept: MEDSURG UNIT | Age: 81
End: 2021-12-30

## 2021-12-30 VITALS
TEMPERATURE: 97.7 F | SYSTOLIC BLOOD PRESSURE: 103 MMHG | RESPIRATION RATE: 12 BRPM | DIASTOLIC BLOOD PRESSURE: 60 MMHG | HEART RATE: 68 BPM | OXYGEN SATURATION: 98 %

## 2021-12-30 DIAGNOSIS — Z17.0 MALIGNANT NEOPLASM OF RIGHT BREAST IN MALE, ESTROGEN RECEPTOR POSITIVE, UNSPECIFIED SITE OF BREAST (HCC): ICD-10-CM

## 2021-12-30 DIAGNOSIS — C50.921 MALIGNANT NEOPLASM OF RIGHT BREAST IN MALE, ESTROGEN RECEPTOR POSITIVE, UNSPECIFIED SITE OF BREAST (HCC): ICD-10-CM

## 2021-12-30 DIAGNOSIS — C80.1 ADENOCARCINOMA (HCC): ICD-10-CM

## 2021-12-30 DIAGNOSIS — C50.921 MALIGNANT NEOPLASM OF RIGHT MALE BREAST, UNSPECIFIED ESTROGEN RECEPTOR STATUS, UNSPECIFIED SITE OF BREAST (HCC): ICD-10-CM

## 2021-12-30 PROCEDURE — 2709999900 HC NON-CHARGEABLE SUPPLY: Performed by: SURGERY

## 2021-12-30 PROCEDURE — 74011000250 HC RX REV CODE- 250: Performed by: SURGERY

## 2021-12-30 PROCEDURE — 76060000061 HC AMB SURG ANES 0.5 TO 1 HR: Performed by: SURGERY

## 2021-12-30 PROCEDURE — 77030002933 HC SUT MCRYL J&J -A: Performed by: SURGERY

## 2021-12-30 PROCEDURE — 36561 INSERT TUNNELED CV CATH: CPT | Performed by: SURGERY

## 2021-12-30 PROCEDURE — 77030031139 HC SUT VCRL2 J&J -A: Performed by: SURGERY

## 2021-12-30 PROCEDURE — 76030000000 HC AMB SURG OR TIME 0.5 TO 1: Performed by: SURGERY

## 2021-12-30 PROCEDURE — C1788 PORT, INDWELLING, IMP: HCPCS | Performed by: SURGERY

## 2021-12-30 PROCEDURE — 77001 FLUOROGUIDE FOR VEIN DEVICE: CPT | Performed by: SURGERY

## 2021-12-30 PROCEDURE — 77030010507 HC ADH SKN DERMBND J&J -B: Performed by: SURGERY

## 2021-12-30 PROCEDURE — 74011250636 HC RX REV CODE- 250/636: Performed by: SURGERY

## 2021-12-30 PROCEDURE — 74011250636 HC RX REV CODE- 250/636: Performed by: ANESTHESIOLOGY

## 2021-12-30 PROCEDURE — 76210000046 HC AMBSU PH II REC FIRST 0.5 HR: Performed by: SURGERY

## 2021-12-30 PROCEDURE — 77030040922 HC BLNKT HYPOTHRM STRY -A

## 2021-12-30 PROCEDURE — 77030011267 HC ELECTRD BLD COVD -A: Performed by: SURGERY

## 2021-12-30 PROCEDURE — 77030040361 HC SLV COMPR DVT MDII -B: Performed by: SURGERY

## 2021-12-30 PROCEDURE — 77030041680 HC PNCL ELECSURG SMK EVAC CNMD -B: Performed by: SURGERY

## 2021-12-30 PROCEDURE — 76210000036 HC AMBSU PH I REC 1.5 TO 2 HR: Performed by: SURGERY

## 2021-12-30 DEVICE — POWERPORT IMPLANTABLE PORT WITH ATTACHABLE 8F CHRONOFLEX OPEN-ENDED SINGLE-LUMEN VENOUS CATHETER INTERMEDIATE KIT (WITH SUTURE PLUGS)
Type: IMPLANTABLE DEVICE | Site: CHEST  WALL | Status: FUNCTIONAL
Brand: POWERPORT, CHRONOFLEX

## 2021-12-30 RX ORDER — LIDOCAINE HYDROCHLORIDE 10 MG/ML
0.1 INJECTION, SOLUTION EPIDURAL; INFILTRATION; INTRACAUDAL; PERINEURAL AS NEEDED
Status: DISCONTINUED | OUTPATIENT
Start: 2021-12-30 | End: 2021-12-30 | Stop reason: HOSPADM

## 2021-12-30 RX ORDER — SODIUM CHLORIDE, SODIUM LACTATE, POTASSIUM CHLORIDE, CALCIUM CHLORIDE 600; 310; 30; 20 MG/100ML; MG/100ML; MG/100ML; MG/100ML
75 INJECTION, SOLUTION INTRAVENOUS CONTINUOUS
Status: CANCELLED | OUTPATIENT
Start: 2021-12-30

## 2021-12-30 RX ORDER — FENTANYL CITRATE 50 UG/ML
25 INJECTION, SOLUTION INTRAMUSCULAR; INTRAVENOUS
Status: CANCELLED | OUTPATIENT
Start: 2021-12-30

## 2021-12-30 RX ORDER — PROPOFOL 10 MG/ML
INJECTION, EMULSION INTRAVENOUS AS NEEDED
Status: DISCONTINUED | OUTPATIENT
Start: 2021-12-30 | End: 2021-12-30 | Stop reason: HOSPADM

## 2021-12-30 RX ORDER — MIDAZOLAM HYDROCHLORIDE 1 MG/ML
0.5 INJECTION, SOLUTION INTRAMUSCULAR; INTRAVENOUS
Status: CANCELLED | OUTPATIENT
Start: 2021-12-30

## 2021-12-30 RX ORDER — DIPHENHYDRAMINE HYDROCHLORIDE 50 MG/ML
12.5 INJECTION, SOLUTION INTRAMUSCULAR; INTRAVENOUS AS NEEDED
Status: CANCELLED | OUTPATIENT
Start: 2021-12-30 | End: 2021-12-30

## 2021-12-30 RX ORDER — DEXMEDETOMIDINE HYDROCHLORIDE 100 UG/ML
INJECTION, SOLUTION INTRAVENOUS AS NEEDED
Status: DISCONTINUED | OUTPATIENT
Start: 2021-12-30 | End: 2021-12-30 | Stop reason: HOSPADM

## 2021-12-30 RX ORDER — MORPHINE SULFATE 2 MG/ML
2 INJECTION, SOLUTION INTRAMUSCULAR; INTRAVENOUS
Status: CANCELLED | OUTPATIENT
Start: 2021-12-30

## 2021-12-30 RX ORDER — LIDOCAINE HYDROCHLORIDE AND EPINEPHRINE 10; 10 MG/ML; UG/ML
30 INJECTION, SOLUTION INFILTRATION; PERINEURAL ONCE
Status: CANCELLED | OUTPATIENT
Start: 2021-12-30 | End: 2021-12-30

## 2021-12-30 RX ORDER — ONDANSETRON 2 MG/ML
INJECTION INTRAMUSCULAR; INTRAVENOUS AS NEEDED
Status: DISCONTINUED | OUTPATIENT
Start: 2021-12-30 | End: 2021-12-30 | Stop reason: HOSPADM

## 2021-12-30 RX ORDER — SODIUM CHLORIDE 0.9 % (FLUSH) 0.9 %
5-40 SYRINGE (ML) INJECTION AS NEEDED
Status: DISCONTINUED | OUTPATIENT
Start: 2021-12-30 | End: 2021-12-30 | Stop reason: HOSPADM

## 2021-12-30 RX ORDER — SODIUM CHLORIDE, SODIUM LACTATE, POTASSIUM CHLORIDE, CALCIUM CHLORIDE 600; 310; 30; 20 MG/100ML; MG/100ML; MG/100ML; MG/100ML
INJECTION, SOLUTION INTRAVENOUS
Status: DISCONTINUED | OUTPATIENT
Start: 2021-12-30 | End: 2021-12-30 | Stop reason: HOSPADM

## 2021-12-30 RX ORDER — FENTANYL CITRATE 50 UG/ML
50 INJECTION, SOLUTION INTRAMUSCULAR; INTRAVENOUS AS NEEDED
Status: DISCONTINUED | OUTPATIENT
Start: 2021-12-30 | End: 2021-12-30 | Stop reason: HOSPADM

## 2021-12-30 RX ORDER — HYDROMORPHONE HYDROCHLORIDE 1 MG/ML
0.2 INJECTION, SOLUTION INTRAMUSCULAR; INTRAVENOUS; SUBCUTANEOUS
Status: CANCELLED | OUTPATIENT
Start: 2021-12-30

## 2021-12-30 RX ORDER — MIDAZOLAM HYDROCHLORIDE 1 MG/ML
1 INJECTION, SOLUTION INTRAMUSCULAR; INTRAVENOUS AS NEEDED
Status: DISCONTINUED | OUTPATIENT
Start: 2021-12-30 | End: 2021-12-30 | Stop reason: HOSPADM

## 2021-12-30 RX ORDER — HEPARIN 100 UNIT/ML
SYRINGE INTRAVENOUS AS NEEDED
Status: DISCONTINUED | OUTPATIENT
Start: 2021-12-30 | End: 2021-12-30 | Stop reason: HOSPADM

## 2021-12-30 RX ORDER — SODIUM CHLORIDE 0.9 % (FLUSH) 0.9 %
5-40 SYRINGE (ML) INJECTION EVERY 8 HOURS
Status: DISCONTINUED | OUTPATIENT
Start: 2021-12-30 | End: 2021-12-30 | Stop reason: HOSPADM

## 2021-12-30 RX ORDER — LIDOCAINE HYDROCHLORIDE AND EPINEPHRINE 10; 10 MG/ML; UG/ML
INJECTION, SOLUTION INFILTRATION; PERINEURAL AS NEEDED
Status: DISCONTINUED | OUTPATIENT
Start: 2021-12-30 | End: 2021-12-30 | Stop reason: HOSPADM

## 2021-12-30 RX ORDER — BUPIVACAINE HYDROCHLORIDE AND EPINEPHRINE 5; 5 MG/ML; UG/ML
30 INJECTION, SOLUTION EPIDURAL; INTRACAUDAL; PERINEURAL ONCE
Status: CANCELLED | OUTPATIENT
Start: 2021-12-30 | End: 2021-12-30

## 2021-12-30 RX ORDER — SODIUM CHLORIDE 9 MG/ML
50 INJECTION, SOLUTION INTRAVENOUS CONTINUOUS
Status: DISCONTINUED | OUTPATIENT
Start: 2021-12-30 | End: 2021-12-30 | Stop reason: HOSPADM

## 2021-12-30 RX ORDER — ONDANSETRON 2 MG/ML
4 INJECTION INTRAMUSCULAR; INTRAVENOUS AS NEEDED
Status: CANCELLED | OUTPATIENT
Start: 2021-12-30

## 2021-12-30 RX ORDER — SODIUM CHLORIDE, SODIUM LACTATE, POTASSIUM CHLORIDE, CALCIUM CHLORIDE 600; 310; 30; 20 MG/100ML; MG/100ML; MG/100ML; MG/100ML
125 INJECTION, SOLUTION INTRAVENOUS CONTINUOUS
Status: DISCONTINUED | OUTPATIENT
Start: 2021-12-30 | End: 2021-12-30 | Stop reason: HOSPADM

## 2021-12-30 RX ORDER — HYDROCODONE BITARTRATE AND ACETAMINOPHEN 5; 325 MG/1; MG/1
1 TABLET ORAL AS NEEDED
Status: CANCELLED | OUTPATIENT
Start: 2021-12-30

## 2021-12-30 RX ORDER — SODIUM CHLORIDE 0.9 % (FLUSH) 0.9 %
5-40 SYRINGE (ML) INJECTION EVERY 8 HOURS
Status: CANCELLED | OUTPATIENT
Start: 2021-12-30

## 2021-12-30 RX ORDER — SODIUM CHLORIDE 9 MG/ML
25 INJECTION, SOLUTION INTRAVENOUS CONTINUOUS
Status: CANCELLED | OUTPATIENT
Start: 2021-12-30

## 2021-12-30 RX ORDER — SODIUM CHLORIDE 0.9 % (FLUSH) 0.9 %
5-40 SYRINGE (ML) INJECTION AS NEEDED
Status: CANCELLED | OUTPATIENT
Start: 2021-12-30

## 2021-12-30 RX ADMIN — DEXMEDETOMIDINE HYDROCHLORIDE 4 MCG: 100 INJECTION, SOLUTION INTRAVENOUS at 11:17

## 2021-12-30 RX ADMIN — SODIUM CHLORIDE, SODIUM LACTATE, POTASSIUM CHLORIDE, CALCIUM CHLORIDE: 600; 310; 30; 20 INJECTION, SOLUTION INTRAVENOUS at 10:41

## 2021-12-30 RX ADMIN — PROPOFOL 40 MCG/KG/MIN: 10 INJECTION, EMULSION INTRAVENOUS at 10:46

## 2021-12-30 RX ADMIN — DEXMEDETOMIDINE HYDROCHLORIDE 4 MCG: 100 INJECTION, SOLUTION INTRAVENOUS at 11:03

## 2021-12-30 RX ADMIN — SODIUM CHLORIDE, SODIUM LACTATE, POTASSIUM CHLORIDE, CALCIUM CHLORIDE: 600; 310; 30; 20 INJECTION, SOLUTION INTRAVENOUS at 10:40

## 2021-12-30 RX ADMIN — LIDOCAINE HYDROCHLORIDE 60 MG: 10 INJECTION, SOLUTION EPIDURAL; INFILTRATION; INTRACAUDAL; PERINEURAL at 10:47

## 2021-12-30 RX ADMIN — PROPOFOL 40 MG: 10 INJECTION, EMULSION INTRAVENOUS at 10:45

## 2021-12-30 RX ADMIN — SODIUM CHLORIDE, POTASSIUM CHLORIDE, SODIUM LACTATE AND CALCIUM CHLORIDE 125 ML/HR: 600; 310; 30; 20 INJECTION, SOLUTION INTRAVENOUS at 09:30

## 2021-12-30 RX ADMIN — ONDANSETRON 4 MG: 2 INJECTION INTRAMUSCULAR; INTRAVENOUS at 11:18

## 2021-12-30 NOTE — ROUTINE PROCESS
Patient: Aleta Gill MRN: [id number]  SSN: [id number]   YOB: 1940  Age: 80 y.o. Sex: male     Patient is status post Procedure(s):  PORTACATH INSERTION (URGENT). Surgeon(s) and Role:     * [place] MD - Primary    Local/Dose/Irrigation:                    Peripheral IV 12/30/21 Right Antecubital (Active)   Site Assessment Clean, dry, & intact 12/30/21 0929   Phlebitis Assessment 0 12/30/21 0929   Infiltration Assessment 0 12/30/21 0929   Dressing Status Clean, dry, & intact 12/30/21 0929   Dressing Type Transparent;Tape 12/30/21 0929   Hub Color/Line Status Blue; Infusing 12/30/21 0929   Alcohol Cap Used Yes 12/30/21 0929                           Dressing/Packing:  Incision 12/30/21 Chest Left-Dressing/Treatment: Skin glue (12/30/21 1115)    Splint/Cast:  ]    Other:

## 2021-12-30 NOTE — BRIEF OP NOTE
Brief Postoperative Note    Patient: Richard Canales  YOB: 1940  MRN: [id number]    Date of Procedure: 12/30/2021     Pre-Op Diagnosis: RIGHT BREAST CANCER & ADENOCARCINOMA    Post-Op Diagnosis: Same as preoperative diagnosis. Procedure(s):  PORTACATH INSERTION (URGENT)    Surgeon(s):  Vinny Eisenberg MD    Surgical Assistant: Surg Asst-1: Elis CALLAHAN    Anesthesia: MAC     Estimated Blood Loss (mL): Minimal    Complications: None    Specimens: * No specimens in log *     Implants:   Implant Name Type Inv.  Item Serial No.  Lot No. LRB No. Used Action   PORT ATTACH CATH POWERPORT 8FR --  - SNA  PORT ATTACH CATH POWERPORT 8FR --  NA [employer]WD [id number] 1 Implanted       Drains: * No LDAs found *    Findings: 8 FR power port left subclavian vein    Electronically Signed by Viviana Melchor MD on 32/01/6298 at 11:35 AM

## 2021-12-30 NOTE — H&P
HISTORY OF PRESENT ILLNESS  Lubna Ng is a [de-identified] y.o. male. HPI  ESTABLISHED Patient here for follow up RIGHT breast mass. No changes since last visit and denies pain tot he RIGHT breast area.      03/10/21: Right breast mass, core biopsy. PATH: Inspissated and necrotic material/debris with reactive fibrosis, macrophages and calcifications. Comment: The etiology of this process is uncertain. Isidor Harps may be related to an infected/ruptured cystic lesion.  Clinical and radiographic correlation is recommended with followup, as clinically indicated.              Past Medical History:   Diagnosis Date    Arthritis      Cancer ([address]       COLO-RECTAL    Chronic pain      Hypertension                 Past Surgical History:   Procedure Laterality Date    HX APPENDECTOMY        HX CHOLECYSTECTOMY        HX COLOSTOMY   2013     COLOSTOMY    HX GI   1995     COLON RESECTION    HX TONSILLECTOMY        HX UROLOGICAL   2017     VAPORZATION         Social History            Socioeconomic History    Marital status:        Spouse name: Not on file    Number of children: Not on file    Years of education: Not on file    Highest education level: Not on file   Occupational History    Not on file   Tobacco Use    Smoking status: Former Smoker    Smokeless tobacco: Never Used   Substance and Sexual Activity    Alcohol use: Yes       Comment: DAILY WINE    Drug use: No    Sexual activity: Not on file   Other Topics Concern    Not on file   Social History Narrative    Not on file      Social Determinants of Health          Financial Resource Strain:     Difficulty of Paying Living Expenses:    Food Insecurity:     Worried About Running Out of Food in the Last Year:     [address] in the Last Year:    Transportation Needs:     Lack of Transportation (Medical):      Lack of Transportation (Non-Medical):    Physical Activity:     Days of Exercise per Week:     Minutes of Exercise per Session:    Stress:  Feeling of Stress :    Social Connections:     Frequency of Communication with Friends and Family:     Frequency of Social Gatherings with Friends and Family:     Attends Jain Services:     Active Member of Clubs or Organizations:     Attends Club or Organization Meetings:     Marital Status:    Intimate Partner Violence:     Fear of Current or Ex-Partner:     Emotionally Abused:     Physically Abused:     Sexually Abused:                 Current Outpatient Medications on File Prior to Visit   Medication Sig Dispense Refill    hydroCHLOROthiazide (HYDRODIURIL) 12.5 mg tablet Take 12.5 mg by mouth daily.        tamsulosin (FLOMAX) 0.4 mg capsule Take 0.4 mg by mouth daily.        CHOLECALCIFEROL, VITAMIN D3, (VITAMIN D3 PO) Take 1.25 mg by mouth every seven (7) days. WEDNESDAY        CYANOCOBALAMIN, VITAMIN B-12, (VITAMIN B-12 INJECTION) by Injection route every month.        MULTIVITAMIN PO Take  by mouth daily.        doxycycline (ADOXA) 50 mg tablet Take 50 mg by mouth daily. (Patient not taking: Reported on 10/8/2021)          No current facility-administered medications on file prior to visit.         No Known Allergies        ROS     Physical Exam  Exam conducted with a chaperone present. Cardiovascular:      Rate and Rhythm: Normal rate and regular rhythm. Heart sounds: Normal heart sounds. Pulmonary:      Breath sounds: Normal breath sounds. Chest:      Breasts: Breasts are symmetrical.         Right: Mass present. No swelling, bleeding, inverted nipple, nipple discharge, skin change or tenderness. Left: Normal. No swelling, bleeding, inverted nipple, mass, nipple discharge, skin change or tenderness. Lymphadenopathy:      Cervical:      Right cervical: No superficial, deep or posterior cervical adenopathy. Left cervical: No superficial, deep or posterior cervical adenopathy.       Upper Body:      Right upper body: No supraclavicular or axillary adenopathy. Left upper body: No supraclavicular or axillary adenopathy.       BREAST ULTRASOUND  Indication: RIGHT breast mass retroareolar  Technique: The area was scanned using a high-frequency linear-array near-field transducer  Findings:  3.0 x 1.8 x 2.18cm mass, homogeneous and smooth, with well placed bx clip  Impression: Benign mass per bx  Disposition: Re-measure in 6 months        ASSESSMENT and PLAN      ICD-10-CM ICD-9-CM     1. Breast mass, right  N63.10 611.72        Patient presents to follow up on RIGHT breast mass, and is doing well overall. Palpable mass on exam at RIGHT breast retroareolar. US visualizes 3.0 x 1.8 x 2.18cm mass, homogeneous and smooth, with well placed bx clip. Will re-measure again in 6 months to make sure mass is not enlarging. F/U in 6 months. This plan was reviewed with the patient and patient agrees. All questions were answered.

## 2021-12-30 NOTE — ANESTHESIA POSTPROCEDURE EVALUATION
Procedure(s):  PORTACATH INSERTION (URGENT). MAC    <BSHSIANPOST>    INITIAL Post-op Vital signs:   Vitals Value Taken Time   /60 12/30/21 1245   Temp 36.1 °C (97 °F) 12/30/21 1144   Pulse 67 12/30/21 1249   Resp 12 12/30/21 1249   SpO2 98 % 12/30/21 1249   Vitals shown include unvalidated device data.

## 2021-12-30 NOTE — PROGRESS NOTES
I have reviewed discharge instructions with the patient and daughter, Penny Dawson. Opportunities for questions offered. The patient and daughter, Penny Dawson verbalized understanding and have no further questions at this time. Copy of AVS given to patient on discharge.

## 2021-12-30 NOTE — DISCHARGE INSTRUCTIONS
Discharge Instructions from Dr. Sheila Blanc    · You may shower, but no hot tubs, swimming pools, or baths until your incision is healed. · No heavy lifting with the affected extremity (nothing greater than 5 pounds), and limit its use for the next 4-5 days. · You may use an ice pack for comfort for the next couple of days, but do not place ice directly on the skin. Rather, use a towel or clothing to serve as a barrier between skin and ice to prevent injury. · If I placed a drain, follow the drain instructions provided, especially as you keep a record of the drain output. · Follow medication instructions carefully. · Watch for signs of infection as listed below. · Redness  · Swelling  · Drainage from the incision or from your nipple that appears infected  · Fever over 101 degrees for consecutive readings, or over 99.5 if you are currently undergoing chemotherapy. · Call our office (number is below) for a follow-up appointment. · If you have any problems, our phone number is [phone]. DISCHARGE SUMMARY from Nurse    PATIENT INSTRUCTIONS:    After general anesthesia or intravenous sedation, for 24 hours or while taking prescription Narcotics:  · Limit your activities  · Do not drive and operate hazardous machinery  · Do not make important personal or business decisions  · Do  not drink alcoholic beverages  · If you have not urinated within 8 hours after discharge, please contact your surgeon on call. Report the following to your surgeon:  · Excessive pain, swelling, redness or odor of or around the surgical area  · Temperature over 100.5  · Nausea and vomiting lasting longer than 4 hours or if unable to take medications  · Any signs of decreased circulation or nerve impairment to extremity: change in color, persistent  numbness, tingling, coldness or increase pain  · Any questions    What to do at Home:  Recommended activity: See surgical instructions.     If you experience any of the following symptoms as noted above, please follow up with Dr. Dylan Page. *  Please give a list of your current medications to your Primary Care Provider. *  Please update this list whenever your medications are discontinued, doses are      changed, or new medications (including over-the-counter products) are added. *  Please carry medication information at all times in case of emergency situations. These are general instructions for a healthy lifestyle:    No smoking/ No tobacco products/ Avoid exposure to second hand smoke  Surgeon General's Warning:  Quitting smoking now greatly reduces serious risk to your health. Obesity, smoking, and sedentary lifestyle greatly increases your risk for illness    A healthy diet, regular physical exercise & weight monitoring are important for maintaining a healthy lifestyle    You may be retaining fluid if you have a history of heart failure or if you experience any of the following symptoms:  Weight gain of 3 pounds or more overnight or 5 pounds in a week, increased swelling in our hands or feet or shortness of breath while lying flat in bed. Please call your doctor as soon as you notice any of these symptoms; do not wait until your next office visit. The discharge information has been reviewed with the patient. The patient verbalized understanding. Discharge medications reviewed with the patient and appropriate educational materials and side effects teaching were provided.

## 2021-12-30 NOTE — OP NOTES
[address]   OPERATIVE REPORT    Name:  Felisa Olivia  MR#:  [id number]  :  1940  ACCOUNT #:  [de-identified]  DATE OF SERVICE:  2021    PREOPERATIVE DIAGNOSES:  1. Colorectal carcinoma. 2.  Carcinoma of the breast.    POSTOPERATIVE DIAGNOSES:  1. Colorectal carcinoma. 2.  Carcinoma of the breast.    PROCEDURE PERFORMED:  Insertion of venous Port-A-Cath for chemotherapy. SURGEON:  Viviana Grady MD    ASSISTANT:  Lynn Guillory    ANESTHESIA:  Local standby. COMPLICATIONS:  None. SPECIMENS REMOVED:  None. IMPLANTS:  8-Italian PowerPort in the left subclavian vein. ESTIMATED BLOOD LOSS:  Minimal.    INDICATION:  The patient is an 61-year-old male who had diagnosed with colorectal carcinoma, requiring chemotherapy. He was also found to have breast lump, which was excised and turned out to be a breast carcinoma, which he does not need chemotherapy for, but he is admitted for Port-A-Cath insertion. PROCEDURE:  After adequate IV sedation, sterile prep and drape, local anesthesia of 1% lidocaine mixed with 0.5% Marcaine, the left subclavian vein was cannulated on the first pass. A wire was passed into the superior vena cava and position confirmed on fluoroscopy. An anterior chest wall pocket was made and an 8-Italian PowerPort was tunneled from the chest wall pocket to the original stick site and cut to length. Using a combination of dilator and breakaway sheath, the catheter was placed in the superior vena cava and position again confirmed on fluoroscopy. The catheter aspirated easily and flushed with heparinized saline and final Hep-Lock flush. The stick site was closed with single U stitch of 4-0 Monocryl and the skin was closed with inner layer of 3-0 Vicryl and a running subcuticular 4-0 Monocryl on the skin. The patient tolerated the procedure well with no complications. He was taken to the recovery room in stable condition.       Lincoln Ware, MD [email or web address]/[id number]/  D:  12/30/2021 11:24  T:  12/30/2021 12:34  JOB #:  [id number]  CC:  MD Richie oMreno MD Francesca Fillers.  Susan Gonzalez MD

## 2022-01-19 ENCOUNTER — DOCUMENTATION ONLY (OUTPATIENT)
Dept: SURGERY | Age: 82
End: 2022-01-19

## 2022-01-19 NOTE — PROGRESS NOTES
The patient's wife called asking for pathology reports that I emailed to her for [facility]. She also has concerns that her  is refusing to eat much of anything and is very depressed. I suggested she speak with their oncologist but I also suggested Beverley Reyes, nurse navigator. We spoke about Ensure and higher fat yogurts. She was appreciative.

## 2022-01-20 ENCOUNTER — NURSE NAVIGATOR (OUTPATIENT)
Dept: CASE MANAGEMENT | Age: 82
End: 2022-01-20

## 2022-01-21 ENCOUNTER — NURSE NAVIGATOR (OUTPATIENT)
Dept: CASE MANAGEMENT | Age: 82
End: 2022-01-21

## 2022-01-21 NOTE — PROGRESS NOTES
[employer]  Breast Navigator Encounter    Name:    Tammy Boateng  Age:    80 y.o. Diagnosis:     Interdisciplinary Team:  Med-Onc:    Dr. Louis Green  Surg-Onc:    Dr. Karyle Glenn:    Plastics:    :     Nurse Navigator:  Katie Ríos RN, BSN, [facility]      Encounter type:  []Patient Initiated  [x]Navigator Follow-up []Pre-op  []Post-op  []Check-in Prior to First Treatment []Treatment Modality Change  []Survivorship Transition []Other:       Narrative:    Received e-mail back from patient's wife. She is going to try to arrange to meet with Dr. Clifton Luo without her  to explain her 's situation (not getting out of bed, not eating, depression). Genetic testing had not been mentioned as of yet. She asked me to pass information on to Dr. Clifton uLo. I reached out today to [facility]. I had to leave a message on the initial voice mail. Asked that Dr. Jia Kaiser nurse call me back so I could relay a few things to her. I then sent an e-mail to [facility] letting her know that I left a message for Dr. Jia Kaiser nurse. If I receive a call back, I will mention that [facility] wants to come in for a discussion with Dr. Clifton Luo and the suggestion of genetic testing due to this man's FH.      ADDENDUM, 1:15 PM -  Micaela called back, but I was with a patient. When I called her back, she was not available, so I had to L/M for her. I L/M regarding the above - patient not doing well, genetic testing and wife wanting to talk to Dr. Clifton Luo privately.             Katie Ríos RN, BSN, [facility]  Oncology Breast Navigator     [employer]  [address] [place] [address] Rama Vidal  [address].  W: [phone]  F: [phone]  [email or web address]  Good Help to Those in [facility]

## 2022-03-09 ENCOUNTER — OFFICE VISIT (OUTPATIENT)
Dept: SURGERY | Age: 82
End: 2022-03-09
Payer: COMMERCIAL

## 2022-03-09 DIAGNOSIS — C50.911 BREAST CANCER, STAGE 2, RIGHT (HCC): Primary | ICD-10-CM

## 2022-03-09 PROCEDURE — 99024 POSTOP FOLLOW-UP VISIT: CPT | Performed by: SURGERY

## 2022-03-09 RX ORDER — ANASTROZOLE 1 MG/1
1 TABLET ORAL DAILY
Qty: 30 TABLET | Refills: 5 | Status: SHIPPED | OUTPATIENT
Start: 2022-03-09

## 2022-03-09 NOTE — PROGRESS NOTES
HISTORY OF PRESENT ILLNESS  Emily Lockwood is a 80 y.o. male. HPI  ESTABLISHED patient here to discuss treatment of RIGHT breast cancer. 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 Denies any pain or changes to his breast since last visit.     12/9/21: RIGHT breast excisional biopsy with US, RIGHT breast invasive carcinoma, ER+%, HI+%, HER2 negative, KI67 4%,   12/30/21: Port insertion  03/10/21: Right breast mass, core biopsy. PATH: Inspissated and necrotic material/debris with reactive fibrosis, macrophages and calcifications. Comment: The etiology of this process is uncertain.  It may be related to an infected/ruptured cystic lesion.  Clinical and radiographic correlation is recommended with followup, as clinically indicated.     ROS    Physical Exam    ASSESSMENT and PLAN  Encounter Diagnoses   Name Primary?  Breast cancer, stage 2, right ([address] Yes        3 treatments for colorectal cancer. Stopped for fistula and cyst on liver Dr. Francia Palomino. No trouble with port. Breast - will start AI,  Does not need more surgery or XRT. Stop AI when on chemotherapy and restart when chemo is completed. Discussed side effects. Won't impede wound healing. Follow up in 6 months.

## 2022-03-09 NOTE — PROGRESS NOTES
HISTORY OF PRESENT ILLNESS  Annabella Adames is a 80 y.o. male. HPI ESTABLISHED patient here to discuss treatment of RIGHT breast cancer. 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 Denies any pain or changes to his breast since last visit. 12/9/21: RIGHT breast excisional biopsy with US, RIGHT breast invasive carcinoma, ER+%, AR+%, HER2 negative, KI67 4%,   12/30/21: Port insertion  03/10/21: Right breast mass, core biopsy. PATH: Inspissated and necrotic material/debris with reactive fibrosis, macrophages and calcifications. Comment: The etiology of this process is uncertain.  It may be related to an infected/ruptured cystic lesion.  Clinical and radiographic correlation is recommended with followup, as clinically indicated. Review of Systems   All other systems reviewed and are negative.       Physical Exam    ASSESSMENT and PLAN  {ASSESSMENT/PLAN:75790}

## 2022-03-11 ENCOUNTER — HOSPITAL ENCOUNTER (OUTPATIENT)
Dept: LAB | Age: 82
Discharge: HOME | End: 2022-03-11

## 2022-03-19 PROBLEM — M16.11 PRIMARY OSTEOARTHRITIS OF RIGHT HIP: Status: ACTIVE | Noted: 2018-02-16

## 2025-05-14 NOTE — PROGRESS NOTES
Arrived 7:18.  Ready for thr. Seen and examined. Chart reviewed.   patient is improving clinically.   Agree with above a/p  Edited where ever is necessary Seen and examined. Chart reviewed.   patient is improving clinically.   Agree with above a/p  Edited where ever is necessary    pain right knee when moves. exam: right knee swelling, bruises, TP. on knee immobilizer.  ortho noted. non-op rx. nwb. knee immobilizer. ortho f/u op.   RICE  added lido patch    Current comorbidities, plan of care, return precautions, fall preventions discussed with the patient, son at bedside and daughter Felipa over phone. . verbalized understanding and agreed with the plan. All questions were answered at his/her satisfaction. Seen and examined. Chart reviewed.   patient is improving clinically.   Agree with above a/p  Edited where ever is necessary    pain right knee when moves. exam: right knee swelling, bruises, TP. on knee immobilizer.  ortho noted. non-op rx. nwb. knee immobilizer. ortho f/u op.   RICE  added lido patch  Current comorbidities, plan of care, return precautions, fall preventions discussed with the patient, son at bedside and daughter Felipa over phone. . verbalized understanding and agreed with the plan. All questions were answered at his/her satisfaction.    Wheelchair:    The patient has a mobility limitation that significantly impairs his/her ability to participate in one or more mobility related activities of daily living [ MRADLs] such as toileting, feeding, dressing, grooming and bathing is customary locations in the home. The patient's mobility limitation cannot be sufficiently resolved by the use of an appropriately fitted cane or walker. use of a manual wheelchair will significantly improve the beneficiary's ability to participate in MRADLs and the beneficiary will use it on a regular basis in the home. Patient is not able to propel standard wheelchair. however she has 24/7 AIDE at home to assists. The patient has not expressed an unwillingness to use manual the manual wheelchair that is provided in the home.     Hospital bed:  Patient needs hospital bed. patient requires frequent changes in body position in bed. Patient's condition requires changing position of body to alleviate pain, help dependent edema, prevent pressure sores, contracture and respiratory infections in ways not feasible in an ordinary bed. She lacks complex motor planning that needs for bed mobility and transfer. Pillows or wedges have been tried without success. Patient's condition requires special attachment that cannot be affixed to or used on an ordinary bed. Patient has 24/7 caregiver who is available, willing, and able to provide assistance with semi electric hospital bed. Patient's height   162 cm. patient is maximum assist. non-weight bearing, fractured patella, periprosthetic right knee fracture, knee immobilizer.     Ruth lift:  Patient requires ruth lift for transferring from bed to wheelchair. Without Ruth lift, the patient will be bedbound. Patient is maximum assisst. has 24/7 Aide.

## (undated) DEVICE — SOLUTION IRRIG 3000ML 0.9% SOD CHL FLX CONT 0797208] ICU MEDICAL INC]

## (undated) DEVICE — GLOVE ORANGE PI 7 1/2   MSG9075

## (undated) DEVICE — HANDPIECE SET WITH BONE CLEANING TIP AND SUCTION TUBE: Brand: INTERPULSE

## (undated) DEVICE — BITEBLOCK ENDOSCP 60FR MAXI WHT POLYETH STURDY W/ VELC WVN

## (undated) DEVICE — PATIENT PROTECTIVE PAD FOR IMP UNIVERSAL LATERAL HIP POSITIONER (ULP) (6/CASE): Brand: PATIENT PROTECTIVE PAD

## (undated) DEVICE — DRSG AQUACEL SURG 3.5 X 10IN -- CONVERT TO ITEM 370183

## (undated) DEVICE — GARMENT,MEDLINE,DVT,INT,CALF,MED, GEN2: Brand: MEDLINE

## (undated) DEVICE — SUTURE VCRL SZ 3-0 L27IN ABSRB UD L26MM SH 1/2 CIR J416H

## (undated) DEVICE — SOLUTION IRRIG 1000ML 0.9% SOD CHL USP POUR PLAS BTL

## (undated) DEVICE — DEVON™ KNEE AND BODY STRAP 60" X 3" (1.5 M X 7.6 CM): Brand: DEVON

## (undated) DEVICE — Device: Brand: SINGLE USE ASPIRATION NEEDLE

## (undated) DEVICE — HYPODERMIC SAFETY NEEDLE: Brand: MONOJECT

## (undated) DEVICE — C-ARM: Brand: UNBRANDED

## (undated) DEVICE — SUTURE VCRL SZ 2-0 L36IN ABSRB UD L40MM CT 1/2 CIR J957H

## (undated) DEVICE — 3M™ IOBAN™ 2 ANTIMICROBIAL INCISE DRAPE 6651EZ: Brand: IOBAN™ 2

## (undated) DEVICE — (D)PREP SKN CHLRAPRP APPL 26ML -- CONVERT TO ITEM 371833

## (undated) DEVICE — DRAIN KT WND 10FR RND 400ML --

## (undated) DEVICE — BAG BELONG PT PERS CLEAR HANDL

## (undated) DEVICE — REM POLYHESIVE ADULT PATIENT RETURN ELECTRODE: Brand: VALLEYLAB

## (undated) DEVICE — SCRUB DRY SURG EZ SCRUB BRUSH PREOPERATIVE GRN

## (undated) DEVICE — SUT SLK 2-0SH 30IN BLK --

## (undated) DEVICE — SYR 10ML LUER LOK 1/5ML GRAD --

## (undated) DEVICE — STERILE POLYISOPRENE POWDER-FREE SURGICAL GLOVES WITH EMOLLIENT COATING: Brand: PROTEXIS

## (undated) DEVICE — 4-PORT MANIFOLD: Brand: NEPTUNE 2

## (undated) DEVICE — NEEDLE HYPO 25GA L1.5IN BVL ORIENTED ECLIPSE

## (undated) DEVICE — DRAPE,LAPAROTOMY,T,PEDI,STERILE: Brand: MEDLINE

## (undated) DEVICE — CONTAINER SPEC 20 ML LID NEUT BUFF FORMALIN 10 % POLYPR STS

## (undated) DEVICE — MASTISOL ADHESIVE LIQ 2/3ML

## (undated) DEVICE — ECHOTIP ULTRA, ENDOSCOPIC ULTRASOUND NEEDLE: Brand: ECHOTIP

## (undated) DEVICE — SYR 3ML LL TIP 1/10ML GRAD --

## (undated) DEVICE — T4 HOOD

## (undated) DEVICE — PREP SKN CHLRAPRP APL 26ML STR --

## (undated) DEVICE — DECANTER BAG 9": Brand: MEDLINE INDUSTRIES, INC.

## (undated) DEVICE — INFECTION CONTROL KIT SYS

## (undated) DEVICE — FORCEPS BX L240CM JAW DIA2.8MM L CAP W/ NDL MIC MESH TOOTH

## (undated) DEVICE — DERMABOND SKIN ADH 0.7ML -- DERMABOND ADVANCED 12/BX

## (undated) DEVICE — Device

## (undated) DEVICE — SUTURE STRATAFIX SYMMETRIC PDS + SZ 1 L18IN ABSRB VLT L48MM SXPP1A400

## (undated) DEVICE — COVER,MAYO STAND,STERILE: Brand: MEDLINE

## (undated) DEVICE — SUTURE MCRYL SZ 4-0 L27IN ABSRB UD L19MM PS-2 1/2 CIR PRIM Y426H

## (undated) DEVICE — CATH IV AUTOGRD BC BLU 22GA 25 -- INSYTE

## (undated) DEVICE — INSULATED BLADE ELECTRODE: Brand: EDGE

## (undated) DEVICE — BLADE ELECTRODE: Brand: EDGE

## (undated) DEVICE — ELECTRODE,RADIOTRANSLUCENT,FOAM,3PK: Brand: MEDLINE

## (undated) DEVICE — SOLUTION IRRIG 1000ML H2O STRL BLT

## (undated) DEVICE — NDL FLTR TIP 5 MIC 18GX1.5IN --

## (undated) DEVICE — SLIM BODY SKIN STAPLER: Brand: APPOSE ULC

## (undated) DEVICE — SYR 5ML 1/5 GRAD LL NSAF LF --

## (undated) DEVICE — HANDLE LT SNAP ON ULT DURABLE LENS FOR TRUMPF ALC DISPOSABLE

## (undated) DEVICE — BASIN EMSIS 16OZ GRAPHITE PLAS KID SHP MOLD GRAD FOR ORAL

## (undated) DEVICE — NDL PRT INJ NSAF BLNT 18GX1.5 --

## (undated) DEVICE — SOLUTION IV 250ML 0.9% SOD CHL CLR INJ FLX BG CONT PRT CLSR

## (undated) DEVICE — PENCIL SMK EVAC L10FT DIA95MM TBNG NONSTICK W ADPT TO 22MM

## (undated) DEVICE — PLASTICS CHEST BREAST ASU: Brand: MEDLINE INDUSTRIES, INC.

## (undated) DEVICE — NEEDLE HYPO 18GA L1.5IN PNK S STL HUB POLYPR SHLD REG BVL

## (undated) DEVICE — BLADE SAW W073XL276IN THK0031IN CUT THK0036IN REPL SAG

## (undated) DEVICE — BAG SPEC BIOHZRD 10 X 10 IN --

## (undated) DEVICE — SET ADMIN 16ML TBNG L100IN 2 Y INJ SITE IV PIGGY BK DISP

## (undated) DEVICE — DRAPE,REIN 53X77,STERILE: Brand: MEDLINE

## (undated) DEVICE — NEEDLE HYPO 22GA L1.5IN BLK S STL HUB POLYPR SHLD REG BVL

## (undated) DEVICE — SOLIDIFIER MEDC 1200ML -- CONVERT TO 356117

## (undated) DEVICE — TOWEL SURG W17XL27IN STD BLU COT NONFENESTRATED PREWASHED

## (undated) DEVICE — GAUZE SPONGES,8 PLY: Brand: CURITY

## (undated) DEVICE — CONTAINER,SPECIMEN,3OZ,OR STRL: Brand: MEDLINE

## (undated) DEVICE — SYR LR LCK 1ML GRAD NSAF 30ML --

## (undated) DEVICE — PAD 05IN BASE 3IN PEAK M DENS CONVOLUTED FOAM

## (undated) DEVICE — KENDALL SCD EXPRESS SLEEVES, KNEE LENGTH, MEDIUM: Brand: KENDALL SCD

## (undated) DEVICE — SUTURE VCRL SZ 1 L27IN ABSRB VLT L36MM CT-1 1/2 CIR J341H

## (undated) DEVICE — 1200 GUARD II KIT W/5MM TUBE W/O VAC TUBE: Brand: GUARDIAN

## (undated) DEVICE — STERILE POLYISOPRENE POWDER-FREE SURGICAL GLOVES: Brand: PROTEXIS

## (undated) DEVICE — KIT COLON W/ 1.1OZ LUB AND 2 END

## (undated) DEVICE — TRAY CATH 16F URIN MTR LTX -- CONVERT TO ITEM 363111

## (undated) DEVICE — MINOR BASIN -SMH: Brand: MEDLINE INDUSTRIES, INC.